# Patient Record
Sex: MALE | Race: WHITE
[De-identification: names, ages, dates, MRNs, and addresses within clinical notes are randomized per-mention and may not be internally consistent; named-entity substitution may affect disease eponyms.]

---

## 2021-08-23 ENCOUNTER — HOSPITAL ENCOUNTER (INPATIENT)
Dept: HOSPITAL 35 - ER | Age: 71
LOS: 4 days | Discharge: NURSING, REHAB OR LONG-TERM CARE | DRG: 682 | End: 2021-08-27
Attending: FAMILY MEDICINE | Admitting: FAMILY MEDICINE
Payer: COMMERCIAL

## 2021-08-23 VITALS — DIASTOLIC BLOOD PRESSURE: 102 MMHG | SYSTOLIC BLOOD PRESSURE: 170 MMHG

## 2021-08-23 VITALS — SYSTOLIC BLOOD PRESSURE: 163 MMHG | DIASTOLIC BLOOD PRESSURE: 112 MMHG

## 2021-08-23 VITALS — DIASTOLIC BLOOD PRESSURE: 73 MMHG | SYSTOLIC BLOOD PRESSURE: 124 MMHG

## 2021-08-23 VITALS — DIASTOLIC BLOOD PRESSURE: 87 MMHG | SYSTOLIC BLOOD PRESSURE: 171 MMHG

## 2021-08-23 VITALS — HEIGHT: 75 IN | BODY MASS INDEX: 31.21 KG/M2 | WEIGHT: 251 LBS

## 2021-08-23 DIAGNOSIS — Z20.822: ICD-10-CM

## 2021-08-23 DIAGNOSIS — N40.0: ICD-10-CM

## 2021-08-23 DIAGNOSIS — I42.9: ICD-10-CM

## 2021-08-23 DIAGNOSIS — G62.9: ICD-10-CM

## 2021-08-23 DIAGNOSIS — Z86.010: ICD-10-CM
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
## 2021-08-23 DIAGNOSIS — R41.0: ICD-10-CM
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
## 2021-08-23 DIAGNOSIS — E86.0: ICD-10-CM

## 2021-08-23 DIAGNOSIS — Z87.891: ICD-10-CM

## 2021-08-23 DIAGNOSIS — Z98.42: ICD-10-CM

## 2021-08-23 DIAGNOSIS — E87.6: ICD-10-CM

## 2021-08-23 DIAGNOSIS — S09.90XA: ICD-10-CM

## 2021-08-23 DIAGNOSIS — G20: ICD-10-CM

## 2021-08-23 DIAGNOSIS — K21.9: ICD-10-CM

## 2021-08-23 DIAGNOSIS — G25.81: ICD-10-CM

## 2021-08-23 DIAGNOSIS — N17.9: Primary | ICD-10-CM

## 2021-08-23 DIAGNOSIS — I48.91: ICD-10-CM

## 2021-08-23 DIAGNOSIS — G90.8: ICD-10-CM
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
## 2021-08-23 DIAGNOSIS — Z98.41: ICD-10-CM

## 2021-08-23 DIAGNOSIS — Z95.0: ICD-10-CM

## 2021-08-23 DIAGNOSIS — M19.90: ICD-10-CM

## 2021-08-23 DIAGNOSIS — G93.41: ICD-10-CM
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
## 2021-08-23 LAB
ALBUMIN SERPL-MCNC: 3.8 G/DL (ref 3.4–5)
ALT SERPL-CCNC: 10 U/L (ref 30–65)
ANION GAP SERPL CALC-SCNC: 8 MMOL/L (ref 7–16)
AST SERPL-CCNC: 16 U/L (ref 15–37)
BASOPHILS NFR BLD AUTO: 0.7 % (ref 0–2)
BILIRUB SERPL-MCNC: 1.3 MG/DL (ref 0.2–1)
BILIRUB UR-MCNC: NEGATIVE MG/DL
BUN SERPL-MCNC: 24 MG/DL (ref 7–18)
CALCIUM SERPL-MCNC: 8.4 MG/DL (ref 8.5–10.1)
CHLORIDE SERPL-SCNC: 105 MMOL/L (ref 98–107)
CO2 SERPL-SCNC: 31 MMOL/L (ref 21–32)
COLOR UR: YELLOW
CREAT SERPL-MCNC: 1.7 MG/DL (ref 0.7–1.3)
EOSINOPHIL NFR BLD: 1.3 % (ref 0–3)
ERYTHROCYTE [DISTWIDTH] IN BLOOD BY AUTOMATED COUNT: 13.2 % (ref 10.5–14.5)
GLUCOSE SERPL-MCNC: 104 MG/DL (ref 74–106)
GRANULOCYTES NFR BLD MANUAL: 76.1 % (ref 36–66)
HCT VFR BLD CALC: 41.8 % (ref 42–52)
HGB BLD-MCNC: 14.1 GM/DL (ref 14–18)
KETONES UR STRIP-MCNC: (no result) MG/DL
LYMPHOCYTES NFR BLD AUTO: 13 % (ref 24–44)
MCH RBC QN AUTO: 31.8 PG (ref 26–34)
MCHC RBC AUTO-ENTMCNC: 33.8 G/DL (ref 28–37)
MCV RBC: 94 FL (ref 80–100)
MONOCYTES NFR BLD: 8.9 % (ref 1–8)
NEUTROPHILS # BLD: 3.9 THOU/UL (ref 1.4–8.2)
PLATELET # BLD: 123 THOU/UL (ref 150–400)
POTASSIUM SERPL-SCNC: 3.1 MMOL/L (ref 3.5–5.1)
PROT SERPL-MCNC: 6.4 G/DL (ref 6.4–8.2)
RBC # BLD AUTO: 4.45 MIL/UL (ref 4.5–6)
RBC # UR STRIP: NEGATIVE /UL
SODIUM SERPL-SCNC: 144 MMOL/L (ref 136–145)
SP GR UR STRIP: 1.02 (ref 1–1.03)
URINE CLARITY: CLEAR
URINE GLUCOSE-RANDOM*: NEGATIVE
URINE LEUKOCYTES-REFLEX: NEGATIVE
URINE NITRITE-REFLEX: NEGATIVE
URINE PROTEIN (DIPSTICK): (no result)
UROBILINOGEN UR STRIP-ACNC: 0.2 E.U./DL (ref 0.2–1)
WBC # BLD AUTO: 5.2 THOU/UL (ref 4–11)

## 2021-08-23 PROCEDURE — 10081 I&D PILONIDAL CYST COMP: CPT

## 2021-08-23 NOTE — EKG
[address] [place]  [address]
Phone:  [phone]                    ELECTROCARDIOGRAM REPORT      
_______________________________________________________________________________
 
Name:       ANURAG LOPEZ                  Room #:                     REG DEV VALENTIN#:      [id number]     Account #:      [id number]  
Admission:  21    Attend Phys:                          
Discharge:              Date of Birth:  [birth year]  
                                                          Report #: [id number]
   [id number]
_______________________________________________________________________________
                         [facility] ED
                                       
Test Date:    2021               Test Time:    15:05:02
Pat Name:     ANURAG LOPEZ             Department:   
Patient ID:   [id number]            Room:          
Gender:       M                        Technician:   FEROZ
:          1950               Requested By: Dustin Beltran
Order Number: [id number] MD:   Demetris Kraus
                                 Measurements
Intervals                              Axis          
Rate:         70                       P:            
NH:           197                      QRS:          -78
QRSD:         190                      T:            86
QT:           488                                    
QTc:          527                                    
                           Interpretive Statements
A-V dual-paced rhythm with some inhibition
No further analysis attempted due to paced rhythm
Baseline wander in lead(s) II,III,aVR,aVF,V2,V5
Compared to ECG 2020 08:51:16
Atrial-paced complex(es) or rhythm no longer present
First degree AV block no longer present
Electronically Signed On 2021 15:15:58 CDT by Demetris Kraus
[email or web address]
 
 
 
 
 
 
 
 
 
 
 
 
 
 
 
 
 
 
 
  <ELECTRONICALLY SIGNED>
   By: Demetris Kraus MD, [facility]    
  21     1515
D: 21 1505                           _____________________________________
T: 21 1505                           Demetris Kraus MD, [facility]      /EPI

## 2021-08-23 NOTE — EMS
[place] [address]                     EMS Patient Care Report       
_______________________________________________________________________________
 
Name:       ANURAG LOPEZ                  Room #:         215-P       ADM IN  
M.R.#:      [id number]                       Account #:      [id number]  
Admission:  21    Attend Phys:    Vaibhav Carpenter MD  
Discharge:              Date of Birth:  [birth year]  
                                                          Report #: [id number]
                                                                    [id number]
_______________________________________________________________________________
THIS REPORT FOR:   //name//                          
 
Report Transmitted: 2021 10:15
EMS Care Summary
[place], [place]/[facility]
Incident [id number] @ 2021 13:35
 
Incident Location
[address] [place] [address]
 
Patient
ANURAG LOPEZ
Male, 71 Years
 1950
 
Patient Address
[address] [place] [address]
 
Patient History
Parkinson's Disease,
 
Patient Allergies
No known allergies,
 
Patient Medications
Levothyroxine,
 
Chief Complaint
SHOULDER PAIN
 
Disposition
Transported No Lights/Bena
 
Dispatch Reason
Falls
 
Transported To
[facility]
 
Narrative
SCENE: ON ARRIVAL PT FOUND LYING SUPINE IN THE GARAGE OF ADDRESS PROVIDED. P37 
ON SCENE HAS PLACED PT ON [place]GAMOVER PRIOR TO EMS ARRIVAL. PT IS AWAKE AND ALERT 
WITHA GCS OF 15. PT REPORTS HE LOST HIS FOOTING AND FELL. PT INITIALLY DENIES 
LOC, BUT REPORTS TAKING A BLOOD THINNER FOR A FIB. PT C/O LEFT SHOULDER PAIN. 
 
 
 
[place] [address]                     EMS Patient Care Report       
_______________________________________________________________________________
 
Name:       ANURAG LOPEZ                  Room #:         215-P       [facility] [place]  
[facility].#:      [id number]                       Account #:      [id number]  
Admission:  21    Attend Phys:    Vaibhav Carpenter MD  
Discharge:              Date of Birth:  [birth year]  
                                                          Report #: [id number]
                                                                    [id number]
_______________________________________________________________________________
PT LIFTED ONTO EMS STRETCHER. 
 
AMBULANCE: VITALS MONITORED. NNO CHANGES.
 
Initial Vitals
@13:48P: 70,R: 14,BP: 151/103,GCS: 15,SpO2: 93,Revised Trauma: 12,
@13:51P: 80,R: 16,BP: 159/79,Pain: 4/10,GCS: 15,SpO2: 96,Revised Trauma: 12,
 
Assessments
@13:57MENTAL:SKIN:No Abnormalities,HEENT:Head/Face: No Abnormalities,Eyes: No 
Abnormalities,Neck/Airway: No Abnormalities,LUNG SOUNDS:General: No 
Abnormalities,Left Upper: No Abnormalities,Right Upper: No Abnormalities,Left 
Lower: No Abnormalities,Right Lower: No Abnormalities,ABDOMEN:General: No 
Abnormalities,Left Upper: No Abnormalities,Right Upper: No Abnormalities,Left 
Lower: No Abnormalities,Right Lower: No Abnormalities,PELVIS//GI:No 
Abnormalities,EXTREMITIES:Left Arm: Other,Right Arm: No Abnormalities,Left Leg: 
No Abnormalities,Right Leg: No Abnormalities,PULSE:NEURO:No 
Abnormalities,@13:57MENTAL:No Abnormalities,SKIN:No 
Abnormalities,HEENT:Head/Face: No Abnormalities,Eyes: No 
Abnormalities,Neck/Airway: No Abnormalities,LUNG SOUNDS:General: No 
Abnormalities,Left Upper: No Abnormalities,Right Upper: No Abnormalities,Left 
Lower: No Abnormalities,Right Lower: No Abnormalities,ABDOMEN:General: No 
Abnormalities,Left Upper: No Abnormalities,Right Upper: No Abnormalities,Left 
Lower: No Abnormalities,Right Lower: No Abnormalities,PELVIS//GI:No 
Abnormalities,EXTREMITIES:Left Arm: Other,Right Arm: No Abnormalities,Left Leg: 
No Abnormalities,Right Leg: No Abnormalities,PULSE:NEURO:No Abnormalities, 
 
Impression
Extremity Pain
 
Procedures
@13:57ALS AssessmentResponse: UnchangedSucceeded@13:57StretcherResponse: 
Unchanged 
 
Timeline
13:33,Call Received
13:33,Dispatch Notified
13:35,Dispatched
13:36,En Route
13:43,On Scene
13:44,At Patient
13:48,BP: 151/103 M,PULSE: 70,RR: 14 R,SPO2: 93 Ox,ETCO2:  ,BG: ,PAIN: ,GCS: 15,
13:49,Depart Scene
13:51,BP: 159/79 M,PULSE: 80,RR: 16 R,SPO2: 96 Ox,ETCO2:  ,BG: ,PAIN: 4,GCS: 15,
13:57,ALS Assessment,Response: UnchangedSucceeded,
13:57,Stretcher,Response: Unchanged
 
 
 
[facility]
[address]
[place]   [address]                     EMS Patient Care Report       
_______________________________________________________________________________
 
Name:       ANURAG LOPEZ                  Room #:         215-P       ADM IN  
M.R.#:      [id number]                       Account #:      [id number]  
Admission:  21    Attend Phys:    Vaibhav Carpenter MD  
Discharge:              Date of Birth:  [birth year]  
                                                          Report #: [id number]
                                                                    [id number]
_______________________________________________________________________________
13:59,At Destination
14:33,Call Closed
 
Disclaimer
v1.1     Copyright  [employer]
This EMS Care Summary contains data elements from the applicable legal record 
(which may be displayed differently). It is designed to provide pertinent 
information for the following purposes: continuity of care, clinical quality, 
and state data reporting. The complete legal record is available to ED staff 
and administrators of the receiving hospital in [employer] Patient Tracker. All data 
is provided "as is."

## 2021-08-24 VITALS — DIASTOLIC BLOOD PRESSURE: 94 MMHG | SYSTOLIC BLOOD PRESSURE: 128 MMHG

## 2021-08-24 VITALS — DIASTOLIC BLOOD PRESSURE: 108 MMHG | SYSTOLIC BLOOD PRESSURE: 166 MMHG

## 2021-08-24 VITALS — DIASTOLIC BLOOD PRESSURE: 107 MMHG | SYSTOLIC BLOOD PRESSURE: 172 MMHG

## 2021-08-24 VITALS — SYSTOLIC BLOOD PRESSURE: 154 MMHG | DIASTOLIC BLOOD PRESSURE: 101 MMHG

## 2021-08-24 VITALS — SYSTOLIC BLOOD PRESSURE: 129 MMHG | DIASTOLIC BLOOD PRESSURE: 91 MMHG

## 2021-08-24 LAB
ANION GAP SERPL CALC-SCNC: 9 MMOL/L (ref 7–16)
BUN SERPL-MCNC: 19 MG/DL (ref 7–18)
CALCIUM SERPL-MCNC: 8.5 MG/DL (ref 8.5–10.1)
CHLORIDE SERPL-SCNC: 104 MMOL/L (ref 98–107)
CO2 SERPL-SCNC: 31 MMOL/L (ref 21–32)
CREAT SERPL-MCNC: 1.2 MG/DL (ref 0.7–1.3)
GLUCOSE SERPL-MCNC: 98 MG/DL (ref 74–106)
POTASSIUM SERPL-SCNC: 2.9 MMOL/L (ref 3.5–5.1)
SODIUM SERPL-SCNC: 144 MMOL/L (ref 136–145)

## 2021-08-24 NOTE — NUR
Patient admits post fall at home. Patient with hx of Parkinsons. Patient
evaled by [facility] and initially accepted. Patient declined wanted to return home
soon. Reports wife is in [place].  When met with patient he was trying to
stand and reported his legs were hurting. Notfied RN. Patient became
increasing restless. He wanted to leave hospital. Security arrived and patient
in restraints. Sp with wife. She reports this has happended last time patient
in hospital. She reports patient takes Ropinirole for Parkinsons and restless
legs. She reports he takes 3 times a day. If he has not rec his legs become
very restless and he will want to walk. Wife reports they live in single story
home with ramp. Patient uses 4 prong cane or rolator walker intermittantly.
Groceries delivered. Both cook. Wife sets up pill box for patient. Patient
drives. Wife reports she is in w[place] but can manage at home. Her sister in law
lives across the street and can assist. Patient has rec outpatient therapy at
[facility] in past. No hx of HH. Reviewed role of casemgt. Therapy evals in process
casemgt following.

## 2021-08-24 NOTE — NUR
PT ALERT AND ORIENTED TIMES THREE THIS MORNING. VSS. PT C/O PAIN PRN PAIN
MEDICAIONS GIVEN WITH GOOD RE[employer]. PT TOLERATES MEDS AND MEALS. PT UP NWITH
ASSIST OF ONE WILL CONTINUE TO MONITOR.

## 2021-08-24 NOTE — NUR
Pt was an ER admit who present with fall. Pt is in fall precaution.
Verbalizes pain. Pain medication administered. Admission assessment and
documentation documented. Pt is alert and oriented and follows direction.
Scheduled meds administered to pt. Vital signs stable. No acute events through
the night. Continue to monitor.No further needs at this time.

## 2021-08-25 VITALS — DIASTOLIC BLOOD PRESSURE: 97 MMHG | SYSTOLIC BLOOD PRESSURE: 147 MMHG

## 2021-08-25 VITALS — SYSTOLIC BLOOD PRESSURE: 184 MMHG | DIASTOLIC BLOOD PRESSURE: 115 MMHG

## 2021-08-25 VITALS — SYSTOLIC BLOOD PRESSURE: 184 MMHG | DIASTOLIC BLOOD PRESSURE: 114 MMHG

## 2021-08-25 VITALS — SYSTOLIC BLOOD PRESSURE: 179 MMHG | DIASTOLIC BLOOD PRESSURE: 105 MMHG

## 2021-08-25 LAB
ANION GAP SERPL CALC-SCNC: 10 MMOL/L (ref 7–16)
BUN SERPL-MCNC: 17 MG/DL (ref 7–18)
CALCIUM SERPL-MCNC: 8.4 MG/DL (ref 8.5–10.1)
CHLORIDE SERPL-SCNC: 104 MMOL/L (ref 98–107)
CO2 SERPL-SCNC: 28 MMOL/L (ref 21–32)
CREAT SERPL-MCNC: 0.9 MG/DL (ref 0.7–1.3)
GLUCOSE SERPL-MCNC: 89 MG/DL (ref 74–106)
POTASSIUM SERPL-SCNC: 3.3 MMOL/L (ref 3.5–5.1)
SODIUM SERPL-SCNC: 142 MMOL/L (ref 136–145)

## 2021-08-25 NOTE — NUR
PT RESTRAINED WITH B/L SOFT WRIST RESTRAINTS MD [facility] PROTOCAL. PT CONTINUES TO
BE AGITATED, VERBALLY AND PHYSICALLY ABUSIVE TOWARDS STAFF. NEUROLOGY AND
PSYCH CONSULTED. POTASSIUM REPLACED PER [facility] PROTOCAL. PT SOME HOW PULLED PIV
OUT. RN STARTED NEW PIV. PT AFEBRILE, ADEQUATE UOP (INCONTINENT), NO BM, POOR
APPETITE. PT, WIFE, AND NEPHEW HAVE ALL BEEN THOUROUGHLY UPDATED AND EDUCATED
ON PT CONDITION AND POC. PT NOT PROGRESSING TOWARDS POC.

## 2021-08-25 NOTE — HC
[facility]
[address]
[place]   [address]                     CONSULTATION                  
_______________________________________________________________________________
 
Name:       ANURAG LOPEZ                  Room #:         215-P       ADM IN  
M.R.#:      [id number]                       Account #:      [id number]  
Admission:  08/23/21    Attend Phys:    Vaibhav Carpenter MD  
Discharge:              Date of Birth:  [birth year]  
                                                          Report #: [id number]
                                                                    [id number] 
_______________________________________________________________________________
THIS REPORT FOR:  
 
cc:  Vaibhav Carpenter MD, Neal A. MD Khosla,Michael REYES MD                                         ~
 
DATE OF SERVICE: 08/24/2021
 
HISTORY OF PRESENT ILLNESS:  A 71-year-old male patient who is a poor historian.
 Later on the formal testing indicated that his memory is not very good.  The 
patient was admitted to the hospital because he fell.  Before he fell, he 
appeared to be dizzy.  His BUN and creatinine were high.  His GFR was low, which
has improved and he is feeling better.
 
He has a history of Parkinson disease in the past.  He says he sees a 
neurologist in [facility], but he does not know the name.  He has a history of 
atrial fibrillation as well as orthostatic hypotension.  Some of the records 
indicate that he was on some blood thinner, but I do not find any blood thinner 
on him.  He is on fludrocortisone  and I suspect that is because of his postural
hypotension.  He also has restless leg syndrome.  He is on multiple medications.
 He is on pretty high dose of ropinirole.  He is having multiple metabolic 
problems like his potassium is low.  His BUN and creatinine was high when he 
came in, indicating that he was probably dehydrated.  He did hit his head.  He 
did fall backward and his CT head and C-spine, it was done in the emergency room
and apparently it was okay.  Now his main complaint is that he is having 
restless leg syndrome.  He says he cannot sit or stay still.  He is also on 
gabapentin, he does not know why it is, but may have been tried for restless leg
syndrome.
 
REVIEW OF SYSTEMS:  Also positive for abrasion, acute kidney injury.  He has a 
pacemaker.  He has a history of chest pain, hypertension, bundle taniya block, 
palpitation, Parkinson disease, pulmonary edema.  Records Indicate he had 
multiple falls, but he says he had only one.  This was his relevant 14-point 
review of system.  He also has benign prostatic hypertrophy, Agent Orange 
exposure, some problem with upper eyelid, cardioversion.  He had a cardiac 
catheterization in the past, some question of dysphagia, question of neuropathy,
he does not know the reason and this patient does have autonomic dysfunction.
 
PAST MEDICAL HISTORY:  Positive for the diagnosis of Parkinson disease and he 
follows up with a neurologist at [facility].
 
FAMILY HISTORY:  According to him is unremarkable.
 
SOCIAL HISTORY:  He says he lives with his wife, but the wife does not have to 
help him in day to day activity much.  He used to smoke, but he does not drink 
any alcohol.
 
 
 
[facility]
[address] [place]   [address]                     CONSULTATION                  
_______________________________________________________________________________
 
Name:       ANURAG LOPEZ                  Room #:         215-P       ADM IN  
M.R.#:      [id number]                       Account #:      [id number]  
Admission:  08/23/21    Attend Phys:    Vaibhav Carpenter MD  
Discharge:              Date of Birth:  [birth year]  
                                                          Report #: [id number]
                                                                    [id number] 
_______________________________________________________________________________
 
 
PHYSICAL EXAMINATION:
VITAL SIGNS:  Indicate a blood pressure of 154/101, pulse is 70, respirations 
are 20, temperature is 97.4.
NEUROLOGIC:  He is alert and responsive.  He can tell me what month it is.  He 
could not tell me what date it is and he ultimately knew the president is, but 
could not name one before.  Cranial nerve examination II-XII looks unremarkable.
 His speech looks intact.  He moves all 4 extremities reasonably well.  His 
position sense is intact.  His pulses are present.  I could not elicit the 
reflexes in the lower extremities.  There is no meningeal sign.  There is no 
papilledema.  He is a very well-developed individual.  His hearing and vision 
looks adequate.
CARDIAC:  Indicated that he has a pacemaker.
LUNGS:  No respiratory difficulty was noticed.
 
LABORATORY DATA:  His white count is normal, but potassium is critically low.  
He did have a CT scan, which was reviewed.  He has nice pulses in both lower 
extremities.
 
IMPRESSION:
1.  History of Parkinson disease.  Presently, the patient does not have any 
Parkinson features like tremor or rigidity.  That is not unusual because when 
the patient is on treatment with Parkinson medication, those symptoms can 
disappear.
2.  History of restless leg syndrome.  The patient does appear to have restless 
leg syndrome, but he is on so much medications, but I am not sure what else we 
can do here.
3.  Present episode may be because of his autonomic insufficiency and when 
combined with what appeared to be dehydration, it was probably the etiology of 
his spell.
 
RECOMMENDATIONS:   It is difficult to do more workup on him because his GFR was 
abnormal when he came in and we cannot do an MRI because of his pacemaker.  We 
have to find out whether it was compatible or not.  I am going to get a carotid 
Doppler done in this patient.  He is already being evaluated for rehab and after
he is done with the rehabilitation, he should follow up with his own family 
doctor.  His repeat TSH was normal. I will order the vitamin B12.  Otherwise, I 
think he should follow up with his neurologist as an outpatient or even better 
he should follow up with a movement disorder specialist because his problems 
appear to be intractable and other alternate is needed to be considered in this 
patient.
 
Thank you very much for this referral.
Addendum.  This addendum is being added at the time of signing this note.  I
talked to the admitting doctor Dr. Carpenter.  I did a carotid Doppler on this
 
 
 
[facility]
[address]
[place]   [address]                     CONSULTATION                  
_______________________________________________________________________________
 
Name:       ANURAG LOPEZ                  Room #:         215-P       ADM IN  
M.R.#:      [id number]                       Account #:      [id number]  
Admission:  08/23/21    Attend Phys:    Vaibhav Carpenter MD  
Discharge:              Date of Birth:  [birth year]  
                                                          Report #: [id number]
                                                                    [id number] 
_______________________________________________________________________________
 
patient and that was unremarkable.  His TSH and vitamin B12 was also
unremarkable.  I discussed with Dr. Carpenter that I have limited things to add
to the management of this patient.  This kind of patient should go to tertiary
care center and should be followed up by movement disorder physician and memory
disorder physicians if necessary and if considered appropriate by movement
disorder physician.  I discussed with him that I do not have anything specific
to add and I will follow him as needed but he can follow-up with his own
neurologist but my suggestion will be to go to a tertiary care center and
follow-up with a movement disorder neurologist and he may refer him to a
neurologist subspecializing in memory.I spent more than 50 minutes of time
taking care of this patient today and majority was spent counseling and
coordinating the care and reviewing his imaging studies
 
 
 
 
 
 
 
 
 
 
 
 
 
 
 
 
 
 
 
 
 
 
 
 
 
 
 
 
 
 
 
  <ELECTRONICALLY SIGNED>
   By: Michael Lopez MD         
  08/25/21     1045
D: 08/24/21 1401                           _____________________________________
T: 08/25/21 0000                           Michael Lopez MD           /nt

## 2021-08-25 NOTE — NUR
PT IS CONFUSED REMAINS COMBATIVE AND IN RESTRAINTS. PT BITES AT RESTRAINTS.
REPLACED RESTRATINTS . BED EXTENDER REPLACED BY MAINTENANCE. LUNGS ARE CLEAR
ON ROOM AIR. BOWEL SOUNDS ACTIVE ABDOMEN FLAT AND SOFT. NO EDEMA NOTED. ATIVAN
GIVEN FOR AGITATION. FREQUENT ROUNDING AND OBSERVATION OF PT NEEDED. FOR
NURSING CARE AT THIS POINT. CALL LIGHT WITHIN USE .

## 2021-08-25 NOTE — NUR
EVEN WITH FREQUENT ROUNDING ON PT. PT BITING AT RESTRAINTS. SLITHERED HIS
HANDS OUT AND WAS STANDING ON THE SIDE OF THE BED BY THE COMPUTER RN SIT PT IN
THE CHAIR AND TEAM CAME TO ASSSIST PT BACK TO BED WITH GAIT BELT ON AND
REAPPLY RESTRAINTS. PAGED DR. VALENCIA FOR MEDICATION FOR PT DUE TO HIM COMING
OUT AND ATIVAN NOT HAVING MUCH OF AN EFFECT ON HELPING PT AT THIS TIME.
ONGOING NURISNG CARE AT THIS TIME. BED ALARM ON PT FALL BAND ON AND ID
BRACELET ON. HE BITES HIS WRIST BANDS OFF HIMSELF TOO. WILL CONTINUE TO
MONITOR PER NURSING AT THIS TIME.

## 2021-08-26 VITALS — DIASTOLIC BLOOD PRESSURE: 96 MMHG | SYSTOLIC BLOOD PRESSURE: 147 MMHG

## 2021-08-26 VITALS — SYSTOLIC BLOOD PRESSURE: 147 MMHG | DIASTOLIC BLOOD PRESSURE: 100 MMHG

## 2021-08-26 VITALS — SYSTOLIC BLOOD PRESSURE: 147 MMHG | DIASTOLIC BLOOD PRESSURE: 98 MMHG

## 2021-08-26 VITALS — SYSTOLIC BLOOD PRESSURE: 144 MMHG | DIASTOLIC BLOOD PRESSURE: 98 MMHG

## 2021-08-26 VITALS — DIASTOLIC BLOOD PRESSURE: 103 MMHG | SYSTOLIC BLOOD PRESSURE: 147 MMHG

## 2021-08-26 VITALS — SYSTOLIC BLOOD PRESSURE: 152 MMHG | DIASTOLIC BLOOD PRESSURE: 88 MMHG

## 2021-08-26 LAB
ANION GAP SERPL CALC-SCNC: 11 MMOL/L (ref 7–16)
BUN SERPL-MCNC: 21 MG/DL (ref 7–18)
CALCIUM SERPL-MCNC: 9.2 MG/DL (ref 8.5–10.1)
CHLORIDE SERPL-SCNC: 106 MMOL/L (ref 98–107)
CO2 SERPL-SCNC: 23 MMOL/L (ref 21–32)
CREAT SERPL-MCNC: 1.1 MG/DL (ref 0.7–1.3)
ERYTHROCYTE [DISTWIDTH] IN BLOOD BY AUTOMATED COUNT: 12.8 % (ref 10.5–14.5)
GLUCOSE SERPL-MCNC: 110 MG/DL (ref 74–106)
HCT VFR BLD CALC: 48.8 % (ref 42–52)
HGB BLD-MCNC: 16.5 GM/DL (ref 14–18)
MCH RBC QN AUTO: 32.1 PG (ref 26–34)
MCHC RBC AUTO-ENTMCNC: 33.8 G/DL (ref 28–37)
MCV RBC: 95 FL (ref 80–100)
PLATELET # BLD: 84 THOU/UL (ref 150–400)
POTASSIUM SERPL-SCNC: 3.8 MMOL/L (ref 3.5–5.1)
RBC # BLD AUTO: 5.14 MIL/UL (ref 4.5–6)
SODIUM SERPL-SCNC: 140 MMOL/L (ref 136–145)
WBC # BLD AUTO: 8.1 THOU/UL (ref 4–11)

## 2021-08-26 NOTE — NUR
PT MORE COOPERATIVE WITH STAFF AND WORKED WITH OT THIS AM. BILAT SOFT WRIST
REATRAINTS REMOVED AND SISTER AT BEDSIDE. WILL CONTINUE TO ASSESS.

## 2021-08-26 NOTE — NUR
Met with sister of dayna and sp with wife. MellyN dariel they are following to
determine if candidate for acute rehab. If patient accepted wife wants to
assure patient not to be told he is "discharged" to rehab as patient will
belive he is dc to home. Need to say he is moving to another unit. Wife and
sister all in agreement for acute rehab. They do not want him at a "facility."

## 2021-08-26 NOTE — NUR
pt up in bed a and o, family at side, no distress, able to tell me name, ,
president etc.  no concerns at this time, educated on mariano.

## 2021-08-27 ENCOUNTER — HOSPITAL ENCOUNTER (INPATIENT)
Dept: HOSPITAL 35 - REHABU | Age: 71
LOS: 25 days | Discharge: HOME HEALTH | DRG: 89 | End: 2021-09-21
Attending: PHYSICAL MEDICINE & REHABILITATION | Admitting: PHYSICAL MEDICINE & REHABILITATION
Payer: COMMERCIAL

## 2021-08-27 VITALS — DIASTOLIC BLOOD PRESSURE: 92 MMHG | SYSTOLIC BLOOD PRESSURE: 140 MMHG

## 2021-08-27 VITALS — DIASTOLIC BLOOD PRESSURE: 79 MMHG | SYSTOLIC BLOOD PRESSURE: 113 MMHG

## 2021-08-27 VITALS — SYSTOLIC BLOOD PRESSURE: 120 MMHG | DIASTOLIC BLOOD PRESSURE: 78 MMHG

## 2021-08-27 VITALS — DIASTOLIC BLOOD PRESSURE: 115 MMHG | SYSTOLIC BLOOD PRESSURE: 160 MMHG

## 2021-08-27 VITALS — WEIGHT: 250 LBS | BODY MASS INDEX: 31.08 KG/M2 | HEIGHT: 75 IN

## 2021-08-27 DIAGNOSIS — R44.3: ICD-10-CM

## 2021-08-27 DIAGNOSIS — Z98.41: ICD-10-CM

## 2021-08-27 DIAGNOSIS — N40.0: ICD-10-CM

## 2021-08-27 DIAGNOSIS — R31.9: ICD-10-CM

## 2021-08-27 DIAGNOSIS — Z86.010: ICD-10-CM

## 2021-08-27 DIAGNOSIS — I49.5: ICD-10-CM
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
## 2021-08-27 DIAGNOSIS — Z95.0: ICD-10-CM

## 2021-08-27 DIAGNOSIS — R41.0: ICD-10-CM

## 2021-08-27 DIAGNOSIS — G62.9: ICD-10-CM
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
## 2021-08-27 DIAGNOSIS — G20: ICD-10-CM
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
## 2021-08-27 DIAGNOSIS — Z87.891: ICD-10-CM

## 2021-08-27 DIAGNOSIS — Z98.42: ICD-10-CM

## 2021-08-27 DIAGNOSIS — N39.0: ICD-10-CM

## 2021-08-27 DIAGNOSIS — F01.50: ICD-10-CM

## 2021-08-27 DIAGNOSIS — R13.10: ICD-10-CM

## 2021-08-27 DIAGNOSIS — G25.81: ICD-10-CM

## 2021-08-27 DIAGNOSIS — I95.1: ICD-10-CM

## 2021-08-27 DIAGNOSIS — N17.9: ICD-10-CM

## 2021-08-27 DIAGNOSIS — E55.9: ICD-10-CM

## 2021-08-27 DIAGNOSIS — E53.8: ICD-10-CM

## 2021-08-27 DIAGNOSIS — E03.9: ICD-10-CM

## 2021-08-27 DIAGNOSIS — K59.09: ICD-10-CM

## 2021-08-27 DIAGNOSIS — R53.81: ICD-10-CM

## 2021-08-27 DIAGNOSIS — S06.0X9A: Primary | ICD-10-CM

## 2021-08-27 DIAGNOSIS — E87.6: ICD-10-CM

## 2021-08-27 DIAGNOSIS — B96.4: ICD-10-CM

## 2021-08-27 DIAGNOSIS — I48.91: ICD-10-CM

## 2021-08-27 DIAGNOSIS — D69.6: ICD-10-CM

## 2021-08-27 DIAGNOSIS — Z88.8: ICD-10-CM

## 2021-08-27 DIAGNOSIS — I42.9: ICD-10-CM
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
## 2021-08-27 DIAGNOSIS — K21.9: ICD-10-CM

## 2021-08-27 DIAGNOSIS — E86.0: ICD-10-CM

## 2021-08-27 PROCEDURE — 10112: CPT

## 2021-08-27 NOTE — NUR
1500 ADMITTED TO ROOM 506. PATIENT IS ALERT AND ORIENTED X1 TO PERSON ONLY.
SOME CONFUSION. PATIENT MOREIRA'S,  ARE EQUAL. LUNGS ARE CLEAR AND
DEMINISHED. PATIENT IS ON RA. PATIENT HAS PACEMAKER AND IS ON NO O2. ABD IS
SOFT WITH BSX4. PATIENT HAD BM YESTERDAY. PATIENT VOIDED 250 CC CONCENTRATED
URINE. ENCOURAGED PATIENT TO INCREASE HIS FLUID INTAKE. UP IN THE CHAIR AT
THIS TIME WITH NO IV ACCESS. CALL LIGHT IN REACH. PATIENT HAS CELL PHONE, BUT
IS MISSING HIS CLOTHES AND .  IS WORKING ON THIS. FALL
AND SAFETY PROTOCOLS IN PLACE. DENIES PAIN AT THIS TIME. PT/OT/ST EVALS TO BE
DONE IN A.M. WILL CONTINUE TO MONITER.

## 2021-08-27 NOTE — NUR
Pt dcing to [facility] acute rehab early this afternoon. Family at bedside and aware
and agreeable. Lily alejo liason here and will follow along should he need hh
f/u when dc'd from rehab.

## 2021-08-27 NOTE — NUR
chart review. New to acute rehab. Cm visited with heladio and his wife yara via
phone call. gocovri cap 1 at bedtime, next person to visit will bring some
more up to the nurse. Prior to hospital he was independent when feeling ok at
home. has cane, walker, shower chair and grab bars. 3 steps to enter. wife
sets up his medication. He still drives. [facility] following for
hh needs at dc. Bedside nurse asked if he has any personal clothes?, cm called
[facility] and they are checking on this. Will cont following as needed for dc
needs.

## 2021-08-27 NOTE — NUR
PT RESTLESS MOST OF THE NOC, C/O LEG PAIN PRN PAIN MED GIVEN, PT AMBULATED
AROUND GANDHI WITH WALKER, VSS, PLANNS TO GO TO REHAB TODAY, ASSESSMENT AS
CHARTED, WILL CON'T TO MONITOR PER PPOC.

## 2021-08-28 VITALS — DIASTOLIC BLOOD PRESSURE: 67 MMHG | SYSTOLIC BLOOD PRESSURE: 107 MMHG

## 2021-08-28 VITALS — SYSTOLIC BLOOD PRESSURE: 137 MMHG | DIASTOLIC BLOOD PRESSURE: 79 MMHG

## 2021-08-28 LAB
ANION GAP SERPL CALC-SCNC: 8 MMOL/L (ref 7–16)
BILIRUB UR-MCNC: NEGATIVE MG/DL
BUN SERPL-MCNC: 30 MG/DL (ref 7–18)
CALCIUM SERPL-MCNC: 8.8 MG/DL (ref 8.5–10.1)
CHLORIDE SERPL-SCNC: 102 MMOL/L (ref 98–107)
CO2 SERPL-SCNC: 27 MMOL/L (ref 21–32)
COLOR UR: (no result)
CREAT SERPL-MCNC: 1.4 MG/DL (ref 0.7–1.3)
ERYTHROCYTE [DISTWIDTH] IN BLOOD BY AUTOMATED COUNT: 12.8 % (ref 10.5–14.5)
GLUCOSE SERPL-MCNC: 90 MG/DL (ref 74–106)
HCT VFR BLD CALC: 44.9 % (ref 42–52)
HGB BLD-MCNC: 15.1 GM/DL (ref 14–18)
KETONES UR STRIP-MCNC: NEGATIVE MG/DL
MCH RBC QN AUTO: 31.7 PG (ref 26–34)
MCHC RBC AUTO-ENTMCNC: 33.5 G/DL (ref 28–37)
MCV RBC: 94.6 FL (ref 80–100)
PLATELET # BLD: 118 THOU/UL (ref 150–400)
POTASSIUM SERPL-SCNC: 4.1 MMOL/L (ref 3.5–5.1)
RBC # BLD AUTO: 4.74 MIL/UL (ref 4.5–6)
RBC # UR STRIP: (no result) /UL
RBC #/AREA URNS HPF: (no result) /HPF
SODIUM SERPL-SCNC: 137 MMOL/L (ref 136–145)
SP GR UR STRIP: 1.01 (ref 1–1.03)
URINE CLARITY: (no result)
URINE GLUCOSE-RANDOM*: NEGATIVE
URINE LEUKOCYTES-REFLEX: (no result)
URINE NITRITE-REFLEX: POSITIVE
URINE PROTEIN (DIPSTICK): (no result)
URINE WBC-REFLEX: (no result) /HPF (ref 0–5)
UROBILINOGEN UR STRIP-ACNC: 0.2 E.U./DL (ref 0.2–1)
WBC # BLD AUTO: 12.4 THOU/UL (ref 4–11)

## 2021-08-28 NOTE — NUR
PT UP IN CHAIR AT THIS TIME DUE TO BED ALARM GOING OFF AND PT WAS STANDING UP
AND HOLING ONTO THE CHAIR AND STATED HE DIDN'T KNOW WHAT TO DO. PT WAS
INSTRUCTED TO TURN AROUND AND SIT IN THE CHAIR. PT IN CHAIR NOW WITH CHAIR
ALARM.

## 2021-08-28 NOTE — NUR
patient aox1 confused and forgetful. patient ambulates with unsteady gaits in
the room and in the unit. patient impulsive at times.  patient need
redirection. patient wanted to leave the facility per wife but denied when
this nurse went to intevene. patient had high anxiety prn given per drs,
order. patient needs maximum assistance with adl, bed mobility, transfer and
toileting. patient urine is dark brown with strong odor, called  new order
of ua with c&s. fall precaution in place.  patient in bed asleep at this time
breathing regular and unlaboured.

## 2021-08-28 NOTE — NUR
PT NEEDED TO VOID AND USE URINAL. PT NEEDED ASSISTANCE WITH STANDING AND THEN
PT WAS ABLE TO STAND WITH WALKER. PT WAS NOT ABLE TO VOID ON COMMAND.

## 2021-08-28 NOTE — NUR
PT INCON. OF URINE IN BED. PLACED A CONDOM CATH ON PT AT THIS TIME. PT WANTING
TO GET UP OUT OF BED. TOLD PT WE WILL GET UP AT SUPPER TIME. PT STILL WANTING
TO GET UP, ASKED PT WHY HE WANTED TO GET UP, PT STATED HE DIDN'T WANT TO GET
IN TROUBLE. PT FORGETFUL AND NOT HAVING GOOD INSIGHT OR JUDGEMENT AT THIS
TIME.

## 2021-08-28 NOTE — NUR
PT ATTEMPTED TO VOID AGAIN PER URINAL, PT MISSED URINAL AND WAS INCON. OF
URINE. PT UP VIA WALKER TO BED. PT NEEDED TO BE TOLD TO GO TOWARDS THE BED. PT
WAS WALKING FORWARD AWAY FROM THE BED. TOLD PT TO BACK UP A FEW STEPS TOWARDS
THE HEAD OF BED. PT ABLE TO FOLLOW DIRECTIONS. PT ALARM ON WHEN IN BED.

## 2021-08-28 NOTE — NUR
DR. SCHROEDER IS HERE AND TOLD HIM THE HOME DOSE OF BETAPACE. ORDERS WAS PLACED
FOR CORRECT DOSE AND ALSO CORRECTED HOME MED LIST AS IT SHOWED BETAPACE WAS
160MG, CHANGED TO 120MG.

## 2021-08-28 NOTE — NUR
PT WIFE AND SON IS HERE WITH MORE HOME MEDS TO TAKE FOR HS. THEY WANTED TO
KNOW IF HE IS TAKING REQUIP THREE TIMES A DAY. SON STATED THAT WHEN HE MISSED
A DAYS DOSE ONE TIME HE WAS REALY CONFUSED. THEY ALSO STATED HIS BETAPACE IS
120MG NOT 160MG. PT AWAKE THIS AM IN THE BED AND ORIENTED TO SELF. PT
COMPULSIVE DURING THE NIGHT AND GETTING UP WITHOUT HELP AND CONFUSED. PT UP
WITH WALKER.

## 2021-08-29 VITALS — SYSTOLIC BLOOD PRESSURE: 129 MMHG | DIASTOLIC BLOOD PRESSURE: 67 MMHG

## 2021-08-29 VITALS — DIASTOLIC BLOOD PRESSURE: 59 MMHG | SYSTOLIC BLOOD PRESSURE: 100 MMHG

## 2021-08-29 VITALS — SYSTOLIC BLOOD PRESSURE: 100 MMHG | DIASTOLIC BLOOD PRESSURE: 59 MMHG

## 2021-08-29 VITALS — SYSTOLIC BLOOD PRESSURE: 135 MMHG | DIASTOLIC BLOOD PRESSURE: 70 MMHG

## 2021-08-29 LAB
ANION GAP SERPL CALC-SCNC: 10 MMOL/L (ref 7–16)
BUN SERPL-MCNC: 27 MG/DL (ref 7–18)
CALCIUM SERPL-MCNC: 8.8 MG/DL (ref 8.5–10.1)
CHLORIDE SERPL-SCNC: 102 MMOL/L (ref 98–107)
CO2 SERPL-SCNC: 25 MMOL/L (ref 21–32)
CREAT SERPL-MCNC: 1.4 MG/DL (ref 0.7–1.3)
GLUCOSE SERPL-MCNC: 112 MG/DL (ref 74–106)
POTASSIUM SERPL-SCNC: 4.1 MMOL/L (ref 3.5–5.1)
SODIUM SERPL-SCNC: 137 MMOL/L (ref 136–145)

## 2021-08-29 NOTE — NUR
ASSUMED CARE OF PT AT 0700. PT ALERT TO SELF. PT IS CONFUSED TALKS ABOUT THE
[employer] SOME. PT TOLERATING REGULAR DIET. PT STATES HE WANTS TO WALK. THIS NURSE
AND PCA WALKED PT TO BATHROOM WITH GAIT BELT AND WALKER. PT GAIT IS UNSTEADY
AND PT SHUFFLES FEET AND LEANS FORWARD WHILE WALKING. PT IS UP WITH ASSIST X2.
BED ALARM CHAIR ALARM IN PLACE AND ACTIVATED WILL CONTINUE TO MONITOR.

## 2021-08-29 NOTE — NUR
PT ALERT AND ORIENTED X 1, CONFUSED.  INCONT OF URINE IN LARGE AMTS.  URINE
STILL PINK-TINGED.  PT TOOK HS MEDS IN APPLESAUCE WITHOUT DIFFICULTY.  PT
DENIES PAIN OR DISCOMFORT.  BED ALARM ON FOR SAFETY.  PT APPEARS TO BE
SLEEPING ON HOURLY ROUNDS.

## 2021-08-29 NOTE — NUR
PT ALERT AND ORIENTED X 1, CONFUSED.  UP IN RECLINER ALL EVENING.  ASSISTED TO
BED AT HS WITH WALKER AND ASSIST X 2.  INCONT OF URINE.  URINE BLOOD-TINGED.
PT DENIES PAIN OR DISCOMFORT.  HAS NOT BEEN IMPULSIVE SO FAR TONIGHT.  BED
ALARM ON FOR SAFETY.  PT APPEARS TO BE SLEEPING ON HOURLY ROUNDS.

## 2021-08-29 NOTE — NUR
AT APPROXIMATELY 1322 PT WAS FOUNF ON FLOOR, IT APPEARS PT CLIMBED OUT OF
CHAIR. PT DEIES PAIN, OR HITTING HEAD. PT STATED, "I WOKE UP AND MARINA WAS ON
TOP OF ME." "I WENT TO LOOK FOR HIM BUT HE WAS GONE." WIFE CHRISTIAN NOTIFIED,
HOUSE SUPERVISER NOTIFED, DR. SCHROEDER NOTIFIED, NURSE MANAGER WAS NOTIFIED AS
WELL. PT VS AS CHARTED. NO INJURIES NOTED. WILL CONTNIUE TO MONITOR.

## 2021-08-30 VITALS — DIASTOLIC BLOOD PRESSURE: 73 MMHG | SYSTOLIC BLOOD PRESSURE: 114 MMHG

## 2021-08-30 VITALS — SYSTOLIC BLOOD PRESSURE: 114 MMHG | DIASTOLIC BLOOD PRESSURE: 73 MMHG

## 2021-08-30 VITALS — DIASTOLIC BLOOD PRESSURE: 80 MMHG | SYSTOLIC BLOOD PRESSURE: 117 MMHG

## 2021-08-30 NOTE — NUR
ASSUMED CARE OF PT AT 1900. PT IS A&O TO SELF. IS CONFUSED. HAS
HALLUCIANTIONS. IS ALSO IMPULSIVE. IS CLOSE TO NURSE STATION. FREQUENT
ROUNDING & FALL PRECAUTIONS CONTINUED THIS SHIFT. IS UP WITH 1-2 ASSIST, GAIT
BELT WALKER.  PT IS STABLE. IS ON ROOM AIR. DENIES PAIN. WAS SLEEPING FOR
ABOUT 1.5 HOURS. THEN AWAKENED CONFUSED.  HAD AN INCONTINENT EPISODE. THIS
NURSE ASSISTED TO URINATE PRIOR TO SLEEPING & AFTER AWAKENING. URINATING
APPROX 100 ML/VOID. COTY COLORED & CLOUDY URINE.  LABS & VITALS REVIEWED. PT
IS ABLE TO REPOSITION SELF IN BED. CUES PROVIDED.  CALL LIGHT WITHIN REACH.
WILL CONTINUE TO MONITOR.

## 2021-08-30 NOTE — NUR
Nutrition: pt admitted to rehab unit with falls, SANTOSH, hypokalemia. Chart
reviewed. Assessed due to new admission/poor intake? consult. Pt is eating
well, % of meals. He is alert to self, confused. Hx Parkinsons. possible
dementia. Folate deficiency, on supplement. Also on B12. Vitamin D level
pending. Noted weight down 25# from December of 2020 however pt had edema at
that time. BM 8/30. Consider low nutrition risk at present.

## 2021-08-30 NOTE — NUR
ASSUMED CARE OF PT AT 0700. PT CONFUSED. STATES HE HAD TRUCKS RUNNING THRU HIS
ROOM ALL NIGHT. STATES HE BY THE RIVER AND POINTS TO THE RIVER OUTSIDE,
REMINDED PT THAT HE WAS IN THE HOSPITAL REHAB UNIT

## 2021-08-31 VITALS — SYSTOLIC BLOOD PRESSURE: 163 MMHG | DIASTOLIC BLOOD PRESSURE: 107 MMHG

## 2021-08-31 VITALS — SYSTOLIC BLOOD PRESSURE: 129 MMHG | DIASTOLIC BLOOD PRESSURE: 79 MMHG

## 2021-08-31 NOTE — NUR
REPORTS FEELING "WORSE" TODAY DURING AM ASSESSMENT-UNABLE TO ID ANY SPECIFIC
COMPLAINT OR ISSUE OTHER THAN FEELIING "WEEK" NOTED TO HAVE SEVERAL LARGE
LOOSE DIARRHEA STOOLS THIS AM-AND DOES REPORT SOME CRAMPING/STOMACH DISCOMFORT
WITH BM. USING ROLLER WALKER AND MOD ASSIST X 2 FOR AMBULATION. ORIENTED TO
PERSON ONLY.

## 2021-08-31 NOTE — NUR
HAS FREQUENTLY BEEN REQUESTING TO USE URINAL OR GO TO BATHROOM-INSISTING THAT
SHORTS AND BRIEF BE REMOVED BECAUSE "THEY GOT REALLY WET" ALTHOUGH WHEN STAFF
CHECKS PANTS AND BRIEF ARE DRY. WHEN ASKED IF HE WOULD LIKE TO GET UP IN CHAIR
FOR PM STATES "I DON'T THINK THEY WILL LET ME-THEY CAN'T WORK WITH ME FALLING
ALL OVER THE FLOOR" COMPLIENT W MEDS-CONTINUES TO DENY PAIN/DISCOMFORT,
CONVERSATION AT TIMES CIRCUMSTANTIAL AND NON-GOAL DIRECTED. CONTINUES ON FALLS
PRECAUTIONS

## 2021-08-31 NOTE — NUR
Team meeting, recommendation uses [facility], has his own here. wife will cont. to
do meds and finances. he is truing off chair alarm. No driving till cleared by
MD.  Anticipate 9/10. hh (pt, ot, st, nursing and sw)

## 2021-08-31 NOTE — NUR
ASSUMED CARE OF PT AT 1915. PT IS A&OX1. IS IMPULSIVE, COMBATIVE, CONFUSED, &
FORGETFUL. PT CONTINUALLY THREATENED TO LEAVE & GO HOME. HAS CALLED WIFE &
SISTER SEVERAL TIMES ASKING FOR HIS "BILL FOLD" & FOR THEM TO "COME GET ME".
WHEN FAMILY RESPONDS NO, PT HANGS UP THE PHONE ON THEM. PT HAS POURED OUT HIS
URINAL ONTO THE FLOOR, SPAT ON THE FLOOR, & TOOK CLOTHES OFF. THIS NURSE, DAY
NURSE, ANOTHER NURSE, & PCA HAVE ALL USED THERAPUETIC COMMUNICATION AS WELL AS
ORIENTED PT TO SITUATION & PLACE. THIS NURSE & PCA AMBULATED WITH PT IN
HALLWAYS WITH GAIT, BELT, WALKER & FOLLOWED WITH W/C.  FREQUENT CUES WERE
GIVEN FOR PT TO STAND UP STRAIGHT, AS HE CONSISTENTLY LEANED FORWARD &
SHUFFLED HIS FEET. PT DID NOT SLEEP MUCH LAST NIGHT. WAS AWAKE Q30-45 MIN. PT
WAS ADMINISTERED IM HALDO & OTHER NIGHT MEDS. IS STABLE. IS ON ROOM AIR. IS
NEAR NURSE STATION. FREQUENT CHECKS & FALL PRECATUIONS CONTINUED THIS SHIFT.
DENIES PAIN BESIDES RESTLESS LEGS. LABS & VITALS REVIEWED. IS CURRENTLY
ASLEEP. CALL LIGHT WITHIN REACH. WILL CONTINUE TO MONITOR.

## 2021-09-01 VITALS — SYSTOLIC BLOOD PRESSURE: 142 MMHG | DIASTOLIC BLOOD PRESSURE: 86 MMHG

## 2021-09-01 VITALS — SYSTOLIC BLOOD PRESSURE: 150 MMHG | DIASTOLIC BLOOD PRESSURE: 102 MMHG

## 2021-09-01 VITALS — DIASTOLIC BLOOD PRESSURE: 107 MMHG | SYSTOLIC BLOOD PRESSURE: 158 MMHG

## 2021-09-01 NOTE — NUR
ADM VIT D 50,000 UNITS X1. PT TOOK ORAL WITHOUT ANY ISSUES. PT DID SAY HE
NEEDED TO USE THE BATHROOM AND USED THE URINAL AND VOIDED 100ML OF YELLOW
URINE.

## 2021-09-01 NOTE — NUR
PT SITTING OUT IN DINING ROOM GETTING EVAL FROM ST. PT TOLERATING DIET WELL.
PT ON THIN LIQUIDS, TOOK MEDS WHOLE ALL AT A TIME. SPEECH THERAPY SUGGESTED
TAKING ONE MED AT A TIME. PT UP WITH WALKER AND ASSIST. PT WAS IN W/C OUT IN
DINING ROOM.

## 2021-09-02 VITALS — SYSTOLIC BLOOD PRESSURE: 148 MMHG | DIASTOLIC BLOOD PRESSURE: 96 MMHG

## 2021-09-02 VITALS — DIASTOLIC BLOOD PRESSURE: 107 MMHG | SYSTOLIC BLOOD PRESSURE: 159 MMHG

## 2021-09-02 NOTE — NUR
ASSUMED CARE AT 1900 OF 9/1. PATIENT IS A&O TO SELF ONLY. PATIENT WAS SLEEPING
AT START OF SHIFT, PER REPORT PATIENT HAS BEEN NAPPING SINCE AFTER DINNER. AT
HS AROUND 2130 PATIENT WOKE UP AND BEGAN ASKING IF HE WILL BE STAYING HERE FOR
A WEEK, BECAUSE HE NEEDS TO TAKE CARE OF SOMETHINGS. PATIENT IS REPORIENTED TO
PLACE, TIME AND SITUATION. CONTINUES TO SAY HE WONT BE ABLE TO STAY HERE LONG.
HS MEDICATIONS AND PRN PO HALDOL ADMINISTERED. PATIENT WAS ABLE TO CALM DOWN
AND SLEEP FOR ABOUT AN HOUR. PATIENT USED CALL LIGHT TO REQUEST TO USE URINAL
IN BED, BUT PATIENT HAS ALREADY HAS AN EPISODE OF BLADDER INCONTINENCE. FULL
LINEN CHANGE PERFORMED, REQUIRING MINIMAL ASSIT WITH TURNS. PATIENT IS
REMINDED TO USE CALL LIGHT WHEN THE URGE TO PEE COMES AGAIN. PATIENT HAS BEEN
COMPLIANT WITH CALL LIGHT USAGE. NO ATTEMPTS OF GETTING OUT OF BED WITHOUT
ASSISTANCE DURING SHIFT SO FAR. PATIENT WAS ABLE TO DOSE IN AND OUT OF SLEEP,
AS NOTED DURING HOURLY ROUNDS. AROUND 0100 PATIENT AWOKE, AND REQUESTED FOR A
SNACK, WHICH WAS PROVIDED TO PATIENT. PATIENT APPEARS TO BECOME AGITATED AGAIN
AND SAYS HE NEEDS TO LEAVE FOR A LITTLE BIT AND WILL BE BACK BY NOON.
NURSE CONTINUES TO REORIENT PATIENT TO TIME, PLACE AND SITUATION, AND
REDIRECTS PATIENT TO SLEEP SO HE CAN WAKE UP TO PARTICIPATE WITH THERAPY AND
GET STRONGER. PATIENT AGREES. CALL LIGHT PLACED WITHIN REACH, BED ALARM ON AND
HOURLY ROUNDS CONTINUED. WILL CONTINUE TO MONITOR.

## 2021-09-02 NOTE — NUR
CM gave dpoa booklet to NP to give to pt and his wife who is here for visit.
CM notified house sup that might need notary.

## 2021-09-02 NOTE — NUR
PATIENT CARE ASSUMED AT 0700, PATIENT LAYING IN BED BUT WAS NOT SLEEPING,
HE IS ALERT TO SELF AND DISORIENTED TO PLACE, TIME AND SITUATION, ASSESSMENT
DONE, BREATH SOUND CLEAR, ACTIVE BOWEL SOUND, HR NORMAL, RR EVEN NONLABORED ON
RA, COLOR BRISK WITH CAPILLARY REFILL, PATIENT DENIES PAIN, HE HAD A SHOWER
WITH THE HELP OF OT, SPEECH THERAPY ALSO MONITOR HIM WHILE HE TOOK HIS
MEDICATION WHOLE WITH WATER BUT ONE PILL AT A TIME, HE AMBULATE WITH
WALKER/WHEEL CHAIR, ONE PERSON ASSIST, HE IS INCONT OF BLADDER AT TIMES, USES
URINAL BY HIS BEDSIDE, FALL PRECAUTION IN PLACE, WILL CONTINUE TO MONITOR
PATIENT FOR SAFETY.

## 2021-09-02 NOTE — NUR
PT ALERT AND ORIENTED X 1, CONFUSED.  VOIDING SMALL AMTS YELLOW URINE PER
URINAL.  PT TOOK HS MEDS WITH WATER WITHOUT DIFFICULTY.  PT DENIES PAIN OR
DISCOMFORT.  PT HAS NOT BEEN IMPULSIVE SO FAR TONIGHT.  BED ALARM ON FOR
SAFETY.  PT CHECKED ON MORE FREQUENTLY THAN HOURLY ROUNDS.  APPEARS TO BE
SLEEPING.

## 2021-09-03 VITALS — SYSTOLIC BLOOD PRESSURE: 128 MMHG | DIASTOLIC BLOOD PRESSURE: 76 MMHG

## 2021-09-03 VITALS — SYSTOLIC BLOOD PRESSURE: 100 MMHG | DIASTOLIC BLOOD PRESSURE: 68 MMHG

## 2021-09-03 VITALS — DIASTOLIC BLOOD PRESSURE: 110 MMHG | SYSTOLIC BLOOD PRESSURE: 153 MMHG

## 2021-09-03 LAB
ANION GAP SERPL CALC-SCNC: 9 MMOL/L (ref 7–16)
BASOPHILS NFR BLD AUTO: 0.9 % (ref 0–2)
BUN SERPL-MCNC: 31 MG/DL (ref 7–18)
CALCIUM SERPL-MCNC: 8.9 MG/DL (ref 8.5–10.1)
CHLORIDE SERPL-SCNC: 103 MMOL/L (ref 98–107)
CO2 SERPL-SCNC: 28 MMOL/L (ref 21–32)
CREAT SERPL-MCNC: 1.7 MG/DL (ref 0.7–1.3)
EOSINOPHIL NFR BLD: 1.2 % (ref 0–3)
ERYTHROCYTE [DISTWIDTH] IN BLOOD BY AUTOMATED COUNT: 12.6 % (ref 10.5–14.5)
GLUCOSE SERPL-MCNC: 93 MG/DL (ref 74–106)
GRANULOCYTES NFR BLD MANUAL: 75.5 % (ref 36–66)
HCT VFR BLD CALC: 45.1 % (ref 42–52)
HGB BLD-MCNC: 15.5 GM/DL (ref 14–18)
LYMPHOCYTES NFR BLD AUTO: 14.2 % (ref 24–44)
MCH RBC QN AUTO: 32.6 PG (ref 26–34)
MCHC RBC AUTO-ENTMCNC: 34.4 G/DL (ref 28–37)
MCV RBC: 94.8 FL (ref 80–100)
MONOCYTES NFR BLD: 8.2 % (ref 1–8)
NEUTROPHILS # BLD: 4.5 THOU/UL (ref 1.4–8.2)
PLATELET # BLD: 210 THOU/UL (ref 150–400)
POTASSIUM SERPL-SCNC: 4.5 MMOL/L (ref 3.5–5.1)
RBC # BLD AUTO: 4.76 MIL/UL (ref 4.5–6)
SODIUM SERPL-SCNC: 140 MMOL/L (ref 136–145)
WBC # BLD AUTO: 6 THOU/UL (ref 4–11)

## 2021-09-03 NOTE — NUR
Nutrition: pt seen for early followup. Continues to eat very well, % of
meals. Psych following. Alert to self, confused. Hx Parkinsons, possible
dementia. On folate and vitamin D for deficiency. 9/1 BM. Continue low risk.

## 2021-09-03 NOTE — NUR
ASSUMED CARE OF PT AT 1430. PT IS A&O TO SELF. IS ON ROOM AIR. DENIES PAIN. IS
STABLE. IS UP WITH 1 ASSIST, GB, WALKER. FALL PRECAUTIONS & HOURLY ROUNDING
CONTINUED. LABS & VITALS REVIEWED. PT CAN BE IMPULSIVE AT TIMES & SUNDOWNS. PT
IS CURRENTLY IN BED WATCHING TV. CALL LIGHT WITHIN REAH. WILL CONTINUE TO
MONITOR.

## 2021-09-03 NOTE — HC
[facility]
[address] [place]   [address]                     CONSULTATION                  
_______________________________________________________________________________
 
Name:       ANURAG LOPEZ                  Room #:         503-P       ADM IN  
M.R.#:      [id number]                       Account #:      [id number]  
Admission:  08/27/21    Attend Phys:    Ottoniel Gipson MD 
Discharge:              Date of Birth:  [birth year]  
                                                          Report #: [id number]
                                                                    [id number] 
_______________________________________________________________________________
THIS REPORT FOR:  
 
cc:  Vaibhav Carpenter MD, Neal A. MD Deutch,Vaibhav HERMAN. PhD                                           ~
 
DATE OF SERVICE: 08/29/2021
 
NEUROBEHAVIORAL STATUS EXAMINATION
 
AGE:  71
 
ATTENDING PHYSICIAN:  Ottoniel Gipson MD
 
CONSULTANT:  Vaibhav Lopez, PhD
 
CLINICAL PRESENTATION:  The patient is a 71-year-old male admitted to the [facility] for a comprehensive inpatient rehabilitation program to
improve functional mobility, activities of daily living and self-care and
mental status secondary to deficits from a traumatic brain injury and multiple
medical problems.  He is reported to have been sustained a fall, striking his
head when entering his home.  
 
He has a history of Parkinson's disease that includes visual hallucinations. 
His problem list on initial admission to the hospital is acute kidney injury,
bradycardia, chest pain, dyspnea, elevated troponin, fall, former smoker with
remote discontinuation in his past, history of atrial fibrillation,
hypertension, hypokalemia and hypotension, left bundle branch block, left knee
pain, low back pain, obesity, palpitations, Parkinson's disease, pulmonary
edema, recurrent falls and shortness of breath.
 
His assessment on admission to the rehabilitation unit was a fall with closed
head injury and loss of consciousness, Parkinson dementia with acute delirium,
RLS, orthostatic hypotension, hypokalemia, history of AFib and cardiomyopathy. 
A complete description of his medical condition and history can be found in his
medical record.  Neuropsychological consultation was requested to provide
assistance in the assessment of cognitive and emotional status and provide
recommendations and services.
 
Prior to this most recent medical event, he was living at his home with the
assistance of his wife.  The patient is reported to have been both a 
and a  prior to shelter.  He is  with 1 child.  He has
3 sisters and 1 brother.  His family is described as very supportive.  His wife
is reported to have been helping in the management of medication and bill
payment. The deterioration in functioning is described as beginning about 2
years ago.  His employment was he retired as a city worker.  He reports having
 
 
 
[facility]
[address] [place]   [address]                     CONSULTATION                  
_______________________________________________________________________________
 
Name:       ANURAG LOPEZ                  Room #:         503-P       [facility] [place]  
M.R.#:      [id number]                       Account #:      [id number]  
Admission:  08/27/21    Attend Phys:    Ottoniel Gipson MD 
Discharge:              Date of Birth:  [birth year]  
                                                          Report #: [id number]
                                                                    [id number] 
_______________________________________________________________________________
 
had 2 Associate of Arts Degrees, one as a  and the other as an HVAC.
 
TECHNIQUES UTILIZED:  Clinical interview, review of medical records, staff
consultation and behavioral observation, family interview -- sister, mini
mental status exam 2 brief version.
 
EXAMINATION FINDINGS:  The patient was difficult to arouse.  He was unable to
accurately describe events surrounding his admission.  He reports having
fallen, but then thought he was to get on a plane.  The sister provided his
background and history.  He states that he has had visual hallucinations
associated with Parkinson's disease.  This patient does not report difficulty
with sleep.  Appetite, subjective anxiety, depression and memory are reported. 
The patient has had a medical marijuana card.
 
Performance on the MMSE 2 brief version was extremely low with a raw score of
5/16.  He was 3/3 for initial registration, 1/5 for orientation to time, 0/5
for orientation to place and 1/3 for immediate recall of 3 items after a brief
time delay and distraction.
 
The patient is presenting with severe cognitive disorder that is likely
multifactorial.  Contributing to this level of functioning, is very likely
Parkinson's disease related to dementia and traumatic brain injury from his
most recent fall.
DIAGNOSTIC IMPRESSION: 
 
Delirium, Hypoactive Acute
 
Major Neurocognitive Disorder (dementia), with Parkinsonian features and TBI,
without behaivor disorder, extent to be determined
 
RECOMMENDATIONS:  The patient will require 24-hour care to maintain safety. 
Assistance in the management of medication, finances and nutrition.  Continued
distraction and redirection of attention can be used when patient is agitated
or presenting with irritability.  He reports awareness of the visual
hallucinations from parkinsonian disease. Driving should be discontinued.  A
structured and scheduled routine will also be of benefit. Follow up
neuropsychological testing to clarify the severity of neurocognitive disorder.
 
Thank you very much for allowing me to provide the consultation on this
patient.
 
 
 
  <ELECTRONICALLY SIGNED>
   By: Vaibhav Lopez, PhD           
  09/03/21     1248
D: 08/30/21 1743                           _____________________________________
T: 08/31/21 0222                           Vaibhav Lopez, PhD             /nt

## 2021-09-03 NOTE — NUR
NOTED BP ELEVATED THIS AM, AND AM MEDS GIVEN AS ORDERED. BP RECHECK WITH
MANUAL CUFF AND PT RESTING IN BED: 126/78. PT UP WITH THERAPIES THIS AM, AND
COOPERATIVE, PLEASANT, ORIENTED X 3. ALARMS ON, AND TIMED VOIDING SCHEDULE
ENSUING TO FACILITATE CONTINENCE.

## 2021-09-03 NOTE — NUR
Cont. with dcp as needed, juan m gold following for hh needs and will
reach out to his wife, dc 9/10/21

## 2021-09-03 NOTE — PLAN
[facility]
[address]   [address]                     REHAB UNIT PLAN OF CARE       
_______________________________________________________________________________
 
Name:       ANURAG LOPEZ                  Room #:         503-P       ADM IN  
M.R.#:      [id number]                       Account #:      [id number]  
Admission:  08/27/21    Attend Phys:    Ottoniel Gipson MD 
Discharge:              Date of Birth:  [birth year]  
                                                          Report #: [id number]
                                                                    [id number] 
_______________________________________________________________________________
THIS REPORT FOR:  
 
cc:  Vaibhav Carpenter MD, Neal A. MD Smithson,Ottoniel [place] MD                                         ~
 
DATE OF SERVICE: 08/30/2021
 
PROGRESS NOTE AND OVERALL PLAN OF CARE
 
HISTORY OF PRESENT ILLNESS:  The patient was seen on the inpatient rehab cloud.  
He was noted to be found on the floor late yesterday.  No evidence of any 
trauma.  He is in a bed right across from the nurse's station.  He has been 
monitored regarding some confusion.  He seemed appropriate with me for basic 
questions this morning, followed commands.  Temperature 36.6, pulse 70, 
respirations 18, blood pressure 114/73.  He does have a history of Parkinson's 
disease.  Some rigidity and cogwheeling noted of the upper extremities at the 
elbows and wrists.  No focal calf swelling.  He is in no distress.  Transfers 
have been max assist with gait, mod assist 70 feet with a 4-wheeled walker.  In 
occupational therapy, upper body dressing is max assist with lower body 
dressing, max assist.  In speech therapy, he has severe cognitive deficits along
with severe memory deficits.  Of note is that he is being treated for urinary 
tract infection, greater than 100,000 Proteus on 08/29/2021.  He is on 
cefuroxime b.i.d.
 
ASSESSMENT:
1.  Fall prior to admission with closed head injury.
2.  Parkinson's disease.
3.  Suspected Parkinson's dementia with acute delirium.
4.  Urinary tract infection.
5.  Restless legs syndrome.
6.  Orthostatic hypotension.
7.  Hypokalemia.
8.  History of atrial fibrillation.
9.  Cardiomyopathy.
 
PLAN:  The patient is involved in inpatient rehabilitation.  The hospitalist
service is assisting regarding medical management.  The overall plan of care is
based on the pre-admit screen and information garnered from therapy
assessments.  1.  Estimated length of stay is probably around 10-14 days and
likely longer if warranted.  2.  Medical prognosis is reasonably good.  3. 
Anticipated interventions include the interdisciplinary acute inpatient
rehabilitation program.  4.  Anticipated functional outcomes would be for the
patient to improve as far as mobility and ADLs and cognition to hopefully be
able to return back to his home setting.  He was a premorbid front-wheeled
walker ambulator.  5.  Discharge destination would be back home with his wife. 
 
 
 
[address]   [address]                     REHAB UNIT PLAN OF CARE       
_______________________________________________________________________________
 
Name:       ANURAG LOPEZ                  Room #:         503-P       [facility] [place]  
M.R.#:      [id number]                       Account #:      [id number]  
Admission:  08/27/21    Attend Phys:    Ottoniel Gipson MD 
Discharge:              Date of Birth:  [birth year]  
                                                          Report #: [id number]
                                                                    [id number] 
_______________________________________________________________________________
 
There is an adult son in the  area and has a sister who is a retired nurse
who lives across the street from the patient.  6.  Expected therapy by
discipline includes PT, OT and speech 1 hour per day each 5 days a week
throughout the duration of the acute inpatient rehabilitation stay.
 
The patient's prognosis for significant practical improvement within a 
reasonable period of time appears good.  Given the patient's complex medical 
condition and risk of further medical complication, rehabilitation services 
could not be safely provided at the lower level of care such as a skilled 
nursing facility.
 
 
 
 
 
 
 
 
 
 
 
 
 
 
 
 
 
 
 
 
 
 
 
 
 
 
 
 
 
 
 
 
 
  <ELECTRONICALLY SIGNED>
   By: Ottoniel Gipson MD         
  09/03/21     1137
D: 08/30/21 0928                           _____________________________________
T: 08/30/21 1006                           Ottoniel Gipson MD           /nt

## 2021-09-04 VITALS — DIASTOLIC BLOOD PRESSURE: 102 MMHG | SYSTOLIC BLOOD PRESSURE: 150 MMHG

## 2021-09-04 VITALS — SYSTOLIC BLOOD PRESSURE: 131 MMHG | DIASTOLIC BLOOD PRESSURE: 92 MMHG

## 2021-09-04 NOTE — NUR
PT SITTING UP IN W/C THIS AM FINISHED BREAKFAST. PT KNOWS NAME, , PLACE AND
MONTH. PT LUNGS CLEAR. PT HAS BM . PT TOOK MEDS WHOLE WITH THIN WATER. PT
HAS LAP INESSA FOR SAFETY, PT CAN TAKE OFF IF NEEDED ON HIS OWN.

## 2021-09-04 NOTE — NUR
HOUSE SUPERVISOR WAS ABLE TO BE A NOTARY FOR FAMILY AT THIS TIME. PT HAS DPOA
PER WIFE AND SON PAPERWORK IN CHART.

## 2021-09-04 NOTE — NUR
FAMILY LEFT PT NEEDED TO USE BATHROOM. PT WAS ABLE TO STAND AT TOILET AND
VOID. PT THEN BACK TO W/C AND BED. BED ALARM ON.

## 2021-09-04 NOTE — NUR
ASSUMED CARE AT 1900 OF 9/3. PATIENT CONTINUES TO BE CONFUSED, AND ONLY A&O TO
SELF. PATIENT REPORTS NEEDING TO LEAVE THE BOAT, PATIENT IS REORIENTED TO
PLACE AND SITUATION, AND APPEARS AGITATED THAT HE CANNOT LEAVE. HS MEDICATIONS
AND PRN HALDOL ADMINISTERED AT HS. PATIENT APPEARED TO HAVE BEEN ABLE TO SLEEP
UNTIL ABOUT NIDNIGHT. AND BEGAN ASKING ABOUT LEAVING THE BOAT AGAIN, PATIENT
IS REORIENTED AND REDIRECTED TO SLEEP BECAUSE HE IS IN THE HOSPITAL AND WILL
HAVE THERAPY IN THE MORNING. APPEARS TO HAVE FALLEN ASLEEP AGAIN, URINAL AT
BEDSIDE. FALL PRECAUTIONS AND HOURLY ROUNDING IN PLACE. BED ALARM ON, CALL
LIGHT WITHIN REACH, WILL CONTINUE TO MONITOR.

## 2021-09-04 NOTE — NUR
WIFE HERE WITH EXTRA MEDICATION FOR HOME MED HE TAKES AT BEDTIME. SHE BROUGHT
PAPERS THAT NEEDED A NOTARY WITH HER AND HIS ID. SHE WAS HERE YESTERDAY AND
FORGOT TO BRING ID FOR NOTARY. NOTIFIED CHARGE NURSE TO SIGN, SHE WAS BUSY AND
WILL BE UP AS SOON AS SHE CAN.

## 2021-09-05 VITALS — DIASTOLIC BLOOD PRESSURE: 105 MMHG | SYSTOLIC BLOOD PRESSURE: 149 MMHG

## 2021-09-05 VITALS — DIASTOLIC BLOOD PRESSURE: 96 MMHG | SYSTOLIC BLOOD PRESSURE: 131 MMHG

## 2021-09-05 VITALS — SYSTOLIC BLOOD PRESSURE: 130 MMHG | DIASTOLIC BLOOD PRESSURE: 78 MMHG

## 2021-09-05 NOTE — NUR
Assumed pt care at 0700. Pt was alert and oriented to person and situation. Pt
is forgetful. Continent of bowel and bladder this shift. uses urinal. Took med
whole with thin liquid, no difficulty noted. Makes need known to staff. Call
for assistance appropriately.  Calm and cooperative with care. AT this time pt
is eating dinner. will continue to monitor.

## 2021-09-05 NOTE — NUR
ASSUMED CARE AT 1900 OF 9/4. PATIENT IS A&O TO PERSON, PLACE AND TIME.
INTERMITENTLY FORGETFUL. COMPLIANT WITH CARES, NO AGITATION NOTED SO FAR.
INCONTINENT OF BLADDER ONCE, AND WAS ABLE TO USE URINAL AT BED SIDE WITH
MINIMAL ASSIST FROM SIT TO STAND. HAD A SNACK AT BEDTIME, AND TOLERATED ALL
ORAL MEDS WHOLE WITH THIN LIQUIDS. APPEARS TO BE SLEEPING DURING HOURLY
ROUNDS, BED ALARM ON, CALL LIGHT WITHIN REACH. WILL CONTINUE TO MONITOR.

## 2021-09-06 VITALS — DIASTOLIC BLOOD PRESSURE: 78 MMHG | SYSTOLIC BLOOD PRESSURE: 138 MMHG

## 2021-09-06 VITALS — SYSTOLIC BLOOD PRESSURE: 116 MMHG | DIASTOLIC BLOOD PRESSURE: 69 MMHG

## 2021-09-06 LAB
BACTERIA-REFLEX: (no result) /HPF
BILIRUB UR-MCNC: NEGATIVE MG/DL
COLOR UR: YELLOW
KETONES UR STRIP-MCNC: NEGATIVE MG/DL
RBC # UR STRIP: (no result) /UL
SP GR UR STRIP: 1.01 (ref 1–1.03)
URINE CLARITY: (no result)
URINE GLUCOSE-RANDOM*: NEGATIVE
URINE LEUKOCYTES-REFLEX: (no result)
URINE NITRITE-REFLEX: NEGATIVE
URINE PROTEIN (DIPSTICK): (no result)
URINE WBC-REFLEX: (no result) /HPF (ref 0–5)
UROBILINOGEN UR STRIP-ACNC: 0.2 E.U./DL (ref 0.2–1)

## 2021-09-06 NOTE — NUR
ASSUMED CARE AT 1900 OF 9/5. PATIENT IS A&O TO PERSON AND SITUATION. DENIES
PAIN OR SOB. ABLE TO MAKE NEEDS KNOWN. MODERATE ASSIST USING GB AND WALKER,
REQUIRES CUING WITH TRANSFER AND AMBULATION. URINAL AT BEDSIDE, EXHIBITING
URGENCY AND FREQUNECY. REQUESTED FOR A SNACK AT HS, AND TOLERATED MEDS WHOLE
WITH THIN LIQUIDS. FALL PRECAUTIONS IN PLACE, CALL LIGHT WITHIN REACH. WILL
CONTINUE TO MONITOR.

## 2021-09-06 NOTE — NUR
REQUESTING TO URINATE/HAVE BM MULTIPLE TIMES TODAY-IS VOIDING IN MODERATE
AMOUNTS-URINE NOTED TO BE THICK,FOUL SMELLING AND COTY COLORED. LABILE MOOD
WITH FREQUENT EPISODES OF OUTBURSTS/VERBAL AGITATION. C/O "NOT FEELING TOO
WELL BUT IS UNABLE TO BE MORE SPECIFIC "LUNGS CLEAR-BS ACTIVE X4-BM THIS AM-
URINE AS NOTEDT ABOVE-NO DISTENSION/TENDERNESS ON PALPATION. NO COUGH-APICAL
PULSE 84 AND REGULAR-. O2 SATS 96 PERCENT ON RA- PERCENT OF BREAKFAST
AND 75 PERCENT LUNCH. REQUIRES ASSIST X2 TO TRANSFER TO AND FROM BED/TOILET
ETC. REMAINS ON HIGH FALLS RISK-BED EXIT ALARM,CHAIR ALARM IN PLACE

## 2021-09-07 VITALS — SYSTOLIC BLOOD PRESSURE: 101 MMHG | DIASTOLIC BLOOD PRESSURE: 67 MMHG

## 2021-09-07 VITALS — SYSTOLIC BLOOD PRESSURE: 88 MMHG | DIASTOLIC BLOOD PRESSURE: 59 MMHG

## 2021-09-07 LAB
ALBUMIN SERPL-MCNC: 3.2 G/DL (ref 3.4–5)
ALT SERPL-CCNC: 11 U/L (ref 30–65)
ANION GAP SERPL CALC-SCNC: 9 MMOL/L (ref 7–16)
AST SERPL-CCNC: 12 U/L (ref 15–37)
BASOPHILS NFR BLD AUTO: 0.3 % (ref 0–2)
BILIRUB SERPL-MCNC: 0.7 MG/DL (ref 0.2–1)
BUN SERPL-MCNC: 34 MG/DL (ref 7–18)
CALCIUM SERPL-MCNC: 8.1 MG/DL (ref 8.5–10.1)
CHLORIDE SERPL-SCNC: 105 MMOL/L (ref 98–107)
CO2 SERPL-SCNC: 24 MMOL/L (ref 21–32)
CREAT SERPL-MCNC: 1.7 MG/DL (ref 0.7–1.3)
EOSINOPHIL NFR BLD: 1.1 % (ref 0–3)
ERYTHROCYTE [DISTWIDTH] IN BLOOD BY AUTOMATED COUNT: 12.9 % (ref 10.5–14.5)
GLUCOSE SERPL-MCNC: 108 MG/DL (ref 74–106)
GRANULOCYTES NFR BLD MANUAL: 83.7 % (ref 36–66)
HCT VFR BLD CALC: 40.8 % (ref 42–52)
HGB BLD-MCNC: 14.3 GM/DL (ref 14–18)
LYMPHOCYTES NFR BLD AUTO: 9.5 % (ref 24–44)
MAGNESIUM SERPL-MCNC: 1.9 MG/DL (ref 1.8–2.4)
MCH RBC QN AUTO: 32.8 PG (ref 26–34)
MCHC RBC AUTO-ENTMCNC: 35.1 G/DL (ref 28–37)
MCV RBC: 93.2 FL (ref 80–100)
MONOCYTES NFR BLD: 5.4 % (ref 1–8)
NEUTROPHILS # BLD: 8.3 THOU/UL (ref 1.4–8.2)
PLATELET # BLD: 153 THOU/UL (ref 150–400)
POTASSIUM SERPL-SCNC: 4.5 MMOL/L (ref 3.5–5.1)
PROT SERPL-MCNC: 6.2 G/DL (ref 6.4–8.2)
RBC # BLD AUTO: 4.37 MIL/UL (ref 4.5–6)
SODIUM SERPL-SCNC: 138 MMOL/L (ref 136–145)
WBC # BLD AUTO: 10 THOU/UL (ref 4–11)

## 2021-09-07 NOTE — NUR
Team meeting, recommendation:  more confusion. UTI on po abx, pending c & s.
low b/p and soa with active, back on oxygen, encourage use IS and wean o2. min
to mod for dressing on lower body, transfer part mod assist, u step walker
with min to mod assist. mod to sever cognition and memory. needs assist with
pills and bills. 24 hour supervison with ques. Dc 9/14 juan m gold (
pt, ot, st, nursing, sw). No driving till cleared by MD.

## 2021-09-07 NOTE — NUR
PT ASSESSMENT COMPLETED AND VSS. MEDS GIVEN AS ORDERED AND WELL TOLERATED. UA
RESULTS CALLED TO NP UBALDO AND INFORMED HER OF PATIENTS THICK MUCUS LIKE
URINE. FOLLOWED ORDERS. PT AWAKE DURING THE NIGHT EVEN AFTER SLEEP MEDICATION.
INC OF LARGE AMOUNT OF STRONG SMELLING URINE. MYRTLE CARE GIVEN. CREAM APPLIED
TO RED BOTTOM. FUNGAL SCROTUM - FUNGAL BARRIER CREAM APPLIED. ASST WITH
REPOSITION FOR COMFORT. WILL CONTINUE TO MONITOR FREQUENTLY.

## 2021-09-07 NOTE — NUR
ASSUMED CARE AT 0700. PATIENT IS ALERT AND ORIENTED X1 TO PERSON. PATIENT
MOREIRA'S,  ARE EQUAL WITH OCCASIONAL TREMORS. PATIENT HAS HX OF PARKINSONS
DISEASE.  ABD IS SOFT WITH BSX4. PATIENT HAD BM YESTERDAY. PATIENT HAD SOME
SOB WITH O.T.  PT PLACED ON 02 AT 1l per N/C. PATIENTS BOTTOM IS RED FROM
INCONTINENCE. Z GUARD APPLIED. PATIENT VOIDING IN SMALL AMOUNTS AND HAS
BURNING ON URINATION. PATIENTS HAS UTI AND CONTINUES ON PO ABT. BP LOW THIS
AM. ENCOURGED PO FLIUDS. UP IN W/C AND OUT TO DINING ROOM FOR MEALS. FALL AND
SAFETY PROTOCOLS IN PLACE. DENIES PAIN. CONTINUES TO PROGRESS SLOWLY TOWARDS
D/C GOALS. WILL CONTINUE TO MONITER.

## 2021-09-08 VITALS — SYSTOLIC BLOOD PRESSURE: 105 MMHG | DIASTOLIC BLOOD PRESSURE: 69 MMHG

## 2021-09-08 VITALS — DIASTOLIC BLOOD PRESSURE: 57 MMHG | SYSTOLIC BLOOD PRESSURE: 92 MMHG

## 2021-09-08 NOTE — NUR
assumed care approx 1900 evening 9/7. pt lying in bed with head of bed
elevated at change of shift. pt confused and forgetful however cooperative on
this night shift so far. pt voiding small amt urine in urinal. pt took hs meds
with water tolerating well. pt appears to be sleeping soundly. bed alarm on
and call light in reach. will continue to monitor.

## 2021-09-08 NOTE — NUR
ASSUMED CARE AT 0700. PATIENT IS ALERT AND ORIENTED X1, BUT COOPERATIVE.
PATIENT MOREIRA'S,  ARE EQUAL. LUNGS ARE DEMINISHED. ENCOURAGED I.S. PATIENT
CONTINUES ON ORAL ABT FOR UTI. PATIENT IS TOLERATING ABT WITHOUT ADVERSE
AFFECTS. PATIENT IS UP WITH GAIT BELT AND WALKER TO BATHROOM TO VOID ORANGE
COLORED URINE. FALL AND SAFETY PROTOCOLS IN PLACE. UP TO THE W/C AND OUT TO
THE DINING ROOM FOR MEALS. DENIES PAIN AT THIS TIME. CONTINUES TO PROGRESS
TOWARDS D/C GOALS. WILL CONTINUE TO MONITER.

## 2021-09-09 VITALS — DIASTOLIC BLOOD PRESSURE: 70 MMHG | SYSTOLIC BLOOD PRESSURE: 103 MMHG

## 2021-09-09 VITALS — SYSTOLIC BLOOD PRESSURE: 105 MMHG | DIASTOLIC BLOOD PRESSURE: 69 MMHG

## 2021-09-09 NOTE — NUR
PT ASSESSMENT COMPLETED AND VSS. MEDS GIVEN AS ORDERED AND WELL TOLERATED.
ASST WITH REPOSITION FOR COMFORT. MYRTLE CARE PROVIDED. SAT WNL ON RA. SLEEP
MEDICATION HELPFUL. PT SLEEPING WELL AT THIS TIME. WILL CONTINUE TO MONITOR
FREQUENTLY.

## 2021-09-09 NOTE — NUR
PT LYING IN BED AFTER THERAPY. PT SEEMS TIRED. PT STATED HE NEEDED TO USE THE
BATHROOM FOR BM. PT TRANSFERED FROM BED TO W/C AND ENCOURAGED PT TO STRAIGHTEN
HIS KNEES. PT ABLE TO TRANSFER FROM W/C TO TOILET. PT HAD MED SOFE BM, NOTICED
BLOOD WHEN WHIPPING. PT SEEMS TO HAVE AN ACTIVE BLEEDING HEMMOROID, PT SKIN
INTACT TO BUTTOCKS. PT HAS BRIEF PLACED ON. PT LUNGS CLEAR. PT TOOK MEDS WHOLE
WITH THIN WATER, PT DENIES ANY PAIN.

## 2021-09-09 NOTE — NUR
ASSUMED CARE AT 1900 OF 9/8. PATIENT IS A&O TO PERSON AND SITUATION, DENIES
PAIN OR SOB. COOPERATIVE WITH CARES AND MEDS. URINAL AT BED SIDE, CONTINUES TO
EXHIBIT VOIDING FREQUENCY, URINE IS NOW CLEAR YELLOW, WITH NOTED SEDIMENTS.
PATIENT IS WAKING FREQUENTLY TO USE URINAL IN BED. MELATONIN WAS ADMINISTERED
AT HS AS A SLEEP AID. SLEEPING IN BED NOW, FALL PRECAUTIONS IN PLACE AND CALL
LIGHT WITHIN REACH. WILL CONTINUE TO MONITOR.

## 2021-09-09 NOTE — NUR
PT WENT BATHROOM TO VOID. BLADDER SCANNED PER PEMA SEVERINO REQUEST, PT SHOWED
199ML OF URINE IN BLADDER POST VOID.

## 2021-09-10 VITALS — DIASTOLIC BLOOD PRESSURE: 105 MMHG | SYSTOLIC BLOOD PRESSURE: 155 MMHG

## 2021-09-10 VITALS — SYSTOLIC BLOOD PRESSURE: 143 MMHG | DIASTOLIC BLOOD PRESSURE: 73 MMHG

## 2021-09-10 NOTE — NUR
Nutrition followup: pt continues to eat very well on rehab unit, % of
meals. Psych continues to follow. Hx parkinsons, confusion improved. no weight
since 8/27. BM 9/9. ST following pt and diet now downgraded to mechanically
altered chopped with honey thick liquids.  Diet change has not affected
intake. On vitamin D, folate and mirtazapine. Plan to D/C early next week.
Continue as low nutrition risk.

## 2021-09-10 NOTE — NUR
PT WORKING WITH THERAPY AND EATING BREAKFAST. PT TOOK MEDS WITHOUT ANY ISSUES.
PT DENIES ANY PAIN. PT UP WITH WALKER THIS AM AND HAS A BASEBALL HELMENT FOR
HEAD PROTECTION. PT HAS BEEN COMPLAINING OF DIZZINESS WHEN STANDING. PT HAS
KNEES BENT WHEN AMBULATING AND ENCOURAGED TO STAND UP WHEN WALKING. PT LUNGS
CLEAR.

## 2021-09-10 NOTE — NUR
I have reviewed the documentation by RABIA CARLTON from 09/10/21 to
09/10/21 and I concur with it.
SOFI RUTLEDGE M

## 2021-09-11 VITALS — DIASTOLIC BLOOD PRESSURE: 65 MMHG | SYSTOLIC BLOOD PRESSURE: 100 MMHG

## 2021-09-11 VITALS — DIASTOLIC BLOOD PRESSURE: 100 MMHG | SYSTOLIC BLOOD PRESSURE: 160 MMHG

## 2021-09-11 VITALS — DIASTOLIC BLOOD PRESSURE: 79 MMHG | SYSTOLIC BLOOD PRESSURE: 160 MMHG

## 2021-09-11 VITALS — DIASTOLIC BLOOD PRESSURE: 91 MMHG | SYSTOLIC BLOOD PRESSURE: 159 MMHG

## 2021-09-11 VITALS — DIASTOLIC BLOOD PRESSURE: 101 MMHG | SYSTOLIC BLOOD PRESSURE: 166 MMHG

## 2021-09-11 VITALS — SYSTOLIC BLOOD PRESSURE: 163 MMHG | DIASTOLIC BLOOD PRESSURE: 93 MMHG

## 2021-09-11 VITALS — SYSTOLIC BLOOD PRESSURE: 123 MMHG | DIASTOLIC BLOOD PRESSURE: 78 MMHG

## 2021-09-11 LAB
ANION GAP SERPL CALC-SCNC: 9 MMOL/L (ref 7–16)
APTT BLD: 38.4 SECONDS (ref 24.5–32.8)
BASOPHILS NFR BLD AUTO: 0.5 % (ref 0–2)
BUN SERPL-MCNC: 24 MG/DL (ref 7–18)
CALCIUM SERPL-MCNC: 8.6 MG/DL (ref 8.5–10.1)
CHLORIDE SERPL-SCNC: 109 MMOL/L (ref 98–107)
CO2 SERPL-SCNC: 28 MMOL/L (ref 21–32)
CREAT SERPL-MCNC: 1.3 MG/DL (ref 0.7–1.3)
EOSINOPHIL NFR BLD: 1.5 % (ref 0–3)
ERYTHROCYTE [DISTWIDTH] IN BLOOD BY AUTOMATED COUNT: 12.6 % (ref 10.5–14.5)
GLUCOSE SERPL-MCNC: 91 MG/DL (ref 74–106)
GRANULOCYTES NFR BLD MANUAL: 71.4 % (ref 36–66)
HCT VFR BLD CALC: 38.9 % (ref 42–52)
HGB BLD-MCNC: 13.5 GM/DL (ref 14–18)
INR PPP: 1.08
LYMPHOCYTES NFR BLD AUTO: 20.2 % (ref 24–44)
MAGNESIUM SERPL-MCNC: 1.9 MG/DL (ref 1.8–2.4)
MCH RBC QN AUTO: 32.3 PG (ref 26–34)
MCHC RBC AUTO-ENTMCNC: 34.8 G/DL (ref 28–37)
MCV RBC: 93 FL (ref 80–100)
MONOCYTES NFR BLD: 6.4 % (ref 1–8)
NEUTROPHILS # BLD: 3.8 THOU/UL (ref 1.4–8.2)
PLATELET # BLD: 150 THOU/UL (ref 150–400)
POTASSIUM SERPL-SCNC: 4.4 MMOL/L (ref 3.5–5.1)
PROTHROMBIN TIME: 11.7 SECONDS (ref 10.5–12.1)
RBC # BLD AUTO: 4.19 MIL/UL (ref 4.5–6)
SODIUM SERPL-SCNC: 146 MMOL/L (ref 136–145)
WBC # BLD AUTO: 5.3 THOU/UL (ref 4–11)

## 2021-09-11 NOTE — NUR
1630 SWALLOW EVAL COMPLETED. PATIENT IS ALERT AND AWAKE AND ABLE TO FOLLOW
COMMANDS. PATIENT WAS GIVEN THICKENED WATER. HE WAS ABLE TO SWALLOW WITHOUT
DIFFICULTY OR WITH ANY COUGH. NEXT I TRIED APPLESAUCE AND PATIENT WAS ABLE TO
SWALLOW APPLESAUCE WITHOUT ANY COUGH. PATIENT WAS TRIED ON 1700 PILLS AND WAS
ABLE TO SWALLOW PILLS WITHOUT COUGH OR DIFFICULTY. DINNER TRAY SERVED AT 1645.
PATIENT WAS ABLE TO % OF DINNER. PATIENT WAS MONITERED FREQUENTLY.

## 2021-09-11 NOTE — NUR
assumed care approx 1900 evening 9/10. pt up to bathroom to void and thought
he had to have bm which he did not. back to bed and voiding per urinal. pt
took hs meds with pudding tolerating well. pt appears to be sleeping soundly
off and on. bed alarm on and call light in reach. will continue to monitor.

## 2021-09-11 NOTE — NUR
1530 IV KEPPRA 500MG /5 ML ORDERED BY M.D. X 1 DOSE. MED OBTAINED FROM
PHARMACY. AFTER ADMINISTRATION PATIENT WOKE UP AND ASKED "WHAT HAPPENDED TO
ME?"  THIS WRITER TOLD THE PATIENT HE HAD CHANGE IN HIS LOC AND WE DID A "CODE
STROKE" PATIENT IS ALERT AND AWAKE AND BACK TO BASELINE. WILL DO SWALLOW EVAL
PRIOR TO DINNER.

## 2021-09-11 NOTE — NUR
1415 THIS WRITER ENTERED PATIENTS ROOM TO GIVE HIM HIS 1400 MEDS. PATIENT HAD
CHANGE IN LOC , VS WERE TAKEN AND STABLE, /98 , T 98, 96% RA, P 75 R16
. NSG SUPERVISER HERE TO EVALUATE PATIENT. PATIENT OPENED HIS EYES, AND
WAS ABLE TO LIGHTLY SQUEEZE HAND. CODE STROKE PROTOCOL INITATED PER DR. SCHROEDER. DR. ROSS HERE. RESPIRATORY HERE. DR. BURT ON CALL FOR [facility].
NOTIFIED ANSWERING SERVICE. DR. BURT PAGED. PATIENT TO CT SCANNER PER BED.

## 2021-09-11 NOTE — NUR
ASSUMED CARE AT 0700. PATIENT IS ALERT AND ORIENTED X1 TO SELF. PATIENT MOREIRA'S,
 ARE EQUAL. LUNGS ARE CLEAR AND DEMINISHED. ABD IS SOFT WITH BSX4.
PATIENT CONTINUES ON ABT FOR UTI. F3VCBRH DARK COTY COLORED URINE PER URINAL.
PATIENT IS INCONTINENT AT TIMES. ENCOURAGED PO HONEY THICK LIQUIDS. FALL AND
SAFETY PROTOCOLS IN PLACE. DENIES PAIN AT THIS TIME. CONTINUES TO PROGRESS
SLOWLY TOWARDS D/C GOALS. WILL CONTINUE TO MONITER.

## 2021-09-11 NOTE — NUR
1700 DR. AUGUSTIN, NEUROLOGIST HERE TO SEE PATIENT.  SHE DOES NOT THINK THAT
THIS WAS A STROKE OR SEIZURE. (SEE HER NOTE). [facility] STROKE EVAL DONE ON THE
PATIENT BY DR. AUGUSTIN. PATIENT REMAINS AWAKE AND ALERT. WILL CONTINUE TO
MONITER.

## 2021-09-12 VITALS — SYSTOLIC BLOOD PRESSURE: 111 MMHG | DIASTOLIC BLOOD PRESSURE: 74 MMHG

## 2021-09-12 VITALS — SYSTOLIC BLOOD PRESSURE: 116 MMHG | DIASTOLIC BLOOD PRESSURE: 78 MMHG

## 2021-09-12 VITALS — SYSTOLIC BLOOD PRESSURE: 125 MMHG | DIASTOLIC BLOOD PRESSURE: 80 MMHG

## 2021-09-12 LAB
BE(VIVO): -1.9 MMOL/L
CHOLEST SERPL-MCNC: 77 MG/DL (ref ?–200)
HCO3 BLD-SCNC: 21.9 MMOL/L (ref 22–26)
HDLC SERPL-MCNC: 24 MG/DL (ref 40–?)
LDLC SERPL-MCNC: 32 MG/DL (ref ?–100)
PCO2 BLD: 34.6 MMHG (ref 35–45)
PO2 BLD: 83.1 MMHG (ref 80–100)
TC:HDL: 3.2 RATIO
TRIGL SERPL-MCNC: 105 MG/DL (ref ?–150)
VLDLC SERPL CALC-MCNC: 21 MG/DL (ref ?–40)

## 2021-09-12 NOTE — EKG
[address] [employer]
[place]  [address]
Phone:  [phone]                    ELECTROCARDIOGRAM REPORT      
_______________________________________________________________________________
 
Name:       ANURAG LOPEZ                  Room #:         503-P       ADM IN  
M.R.#:      [id number]     Account #:      [id number]  
Admission:  21    Attend Phys:    Ottoniel Gipson MD 
Discharge:              Date of Birth:  [birth year]  
                                                          Report #: [id number]
   [id number]
_______________________________________________________________________________
                          [facility]
                                       
Test Date:    2021               Test Time:    11:54:09
Pat Name:     ANURAG LOPEZ             Department:   
Patient ID:   [id number]            Room:         503 P
Gender:       M                        Technician:   HERB
:          [id number]               Requested By: Lenora Titus
Order Number: [id number] MD:   Catracho Canseco
                                 Measurements
Intervals                              Axis          
Rate:         70                       P:            
AL:           49                       QRS:          -78
QRSD:         169                      T:            78
QT:           458                                    
QTc:          495                                    
                           Interpretive Statements
Atrial-paced rhythm
Nonspecific IVCD with LAD
Left ventricular hypertrophy
Compared to ECG 2021 15:05:02
No significant change
Electronically Signed On 2021 11:55:52 CDT by Catracho Canseco
[email or web address]
 
 
 
 
 
 
 
 
 
 
 
 
 
 
 
 
 
 
 
 
  <ELECTRONICALLY SIGNED>
   By: Catracho Canseco MD               
  21     1155
D: 21 1154                           _____________________________________
T: 21 1154                           MD PADMINI Hurst

## 2021-09-12 NOTE — NUR
ASSUMED CARE AT 0700. PATIENT IS ALERT AND ORIENTED TO PERSON ONLY. PATIENT
MOREIRA'S,  ARE EQUAL. LUNGS ARE DEMINISHED.  ABD IS SOFT WITH BSX4. PATIENT
IS VOIDING COTY COLORED URINE PER URINAL AT TIMES. PATIENT IS INCONTINENT OF
URINE AT TIMES. PATIENT IS UP WITH ASSIST OF 1 STAFF AND GAIT BELT TO
AMBULATED WITH P.T. FALL AND SAFETY PROTOCOLS IN PLACE. S.L. IN PATIENTS LEFT
AC IS PATENT AND INTACT WITHOUT REDNESS OR SWELLING. DENIES PAIN AT THIS TIME.
CONTINUES TO PROGRESS SLOWLY TOWARDS D/C GOALS. WILL CONTINUE TO MONITER.

## 2021-09-12 NOTE — NUR
PT ALERT AND ORIENTED X 1.  VOIDING PER URINAL.  INCONT AT TIMES.  PT TOOK HS
MEDS WITH HONEY THICK LIQUIDS WITHOUT DIFFICULTY.  PT DENIES PAIN OR
DISCOMFORT.  MELATONIN GIVEN AT HS FOR SLEEP.  PT IS AWAKE AT THIS TIME.  BED
ALARM ON FOR SAFETY.  PT CHECKED ON HOURLY ROUNDS.

## 2021-09-12 NOTE — NUR
1030 D5W ORDERED BY DR. SCHROEDER TO RUN AT 50CC/HR. IVF INFUSING PER IV IN THE
PATIENTS RIGHT AC. PATIENT NOTED TO HAVE LEFT FACIAL DROOP AND SLURRED SPEECH.
DR. SCHROEDER NOTIED. . VS /74 P70 R16 , 02 SAT 97% ON RA. DR. DWYER NOTIFIED. ELYSIA, HOUSE SUPERVISOR NOTIFIED AND ON THE FLOOR TO
EVALUATE PATIENT. STAT CT SCAN ORDERED PER DR. BURT.  STAT CT OF HEAD
REPEATED. COMPARED TO YESTERDAYS CT THEIR WAS NO CHANGE. DR. BURT ORDERED
STAT MRI.  PATIENT HAS PACEMAKER AND C.T. CAN'T BE DONE DUE TO NO INFORMATION
ON PATIENTS PACEMAKER BEING AVAILABLE. WIFE WAS NOTIFIED OF PATIENT'S STATUS
BY DR. BURT. THE INFORMATION ON THE PATIENTS PACEMAKER WILL HAVE TO BE
OBTAINED IN AM FROM DR. MCGEE'S OFFICE. MRI WILL BE SCED FOR MONDAY AFTER
AFTER THIS INFORMATION IS OBTAINED. PATIENT STATES " HE IS HUNGRY". PER
 IF PATIENT PASSES BEDSIDE EVAL. HE CAN EAT. PATIENT WAS GIVEN A
BEDSIDE EVAL AND HAD NO DIFFICULTY WITH SWALLOWING. PATIENT WAS OK'D TO EAT
LUNCH.  PATIENT IS NOT HAVING ANY DIFFICULTIES EATING AT THIS TIME. WE WILL
CONTINUE TO MONITER HIM CLOSELY.
ABLE TO HAVE T.P.A.  R/T HIM BEING ON XARELTO P.O. PER ELYSIA PATIENT WILL STAY
ON 5N AND WE WILL MONITER HIM CLOSELY.

## 2021-09-12 NOTE — HC
[facility] [place], [place]
[address] [place]   [address]                     CONSULTATION                  
_______________________________________________________________________________
 
Name:       ANURAG LOPEZ                  Room #:         503-P       ADM IN  
M.R.#:      [id number]                       Account #:      [id number]  
Admission:  08/27/21    Attend Phys:    Ottoniel Gipson MD 
Discharge:              Date of Birth:  [birth year]  
                                                          Report #: [id number]
                                                                    [id number] 
_______________________________________________________________________________
THIS REPORT FOR:  
 
cc:  Vaibhav Carpenter MD, Neal A. MD [place], Roxane S. DO                                          ~
 
 
NEUROLOGY CONSULTATION
 
HISTORY OF PRESENT ILLNESS:  The patient is a 71-year-old man who was admitted 
to rehab after falling from a standing height.  The patient hit his head on the 
ground.  A CT scan of the head was negative.  Afterwards, the patient had 
shoulder pain, but an x-ray was negative.  He also had a CT scan of the cervical
spine, but they showed no acute process.  The patient had acute delirium.  
Psychiatry was consulted.  He required restraint and antipsychotic medication.  
Now that the delirium has improved, he is able to follow commands.  He is 
admitted to [address] [place] [address] for further care.
 
The patient lives at home with his wife who is wheelchair bound.  There is an 
adult son who lives in the area.  His sister is a retired nurse and lives across
the street from the patient.
 
Today, the patient was seen at 1:30 and was well.  He reported being tired.  
Earlier that day, he had worked with physical therapy on the bicycle for 15 
minutes.  When the nurse went to check on the patient at 2:00 p.m., the patient 
was just barely able to open his eyes and was basically minimally responsive.  A
code stroke was called.  A CT scan of the head was done and showed no acute 
intracranial process.  A CT angiogram was done and was also unremarkable.  Lab 
work was not done at the time of the event with the exception of glucose, which 
was 119.  Dr. Quiñones saw the patient and ordered Keppra 500 mg followed by 250
mg b.i.d.
 
PAST MEDICAL HISTORY:  Hypertension, left bundle branch block, Parkinson's 
disease, sick sinus syndrome, atrial fibrillation, autonomic dysfunction, and 
has orthostatic hypotension.
 
PAST SURGICAL HISTORY:  Pacemaker, AV node ablation, bilateral cataracts.
 
MEDICATIONS:  Ropinirole 3 mg 6 mg at bedtime, sotalol 120 mg b.i.d., 
carbidopa/levodopa 250 t.i.d., Depakote 250 mg b.i.d., Flomax 0.4 mg daily, 
folic acid 1 mg daily, gabapentin 300 mg t.i.d., amantadine at bedtime, 
levofloxacin 500 mg daily, melatonin 10 mg at bedtime, mirtazapine 15 mg at 
bedtime, pantoprazole 40 mg daily, Xarelto 20 mg daily, ropinirole 1 mg t.i.d., 
Senokot 4 tablets b.i.d., Synthroid 75 mcg daily.
 
ALLERGIES:  BEE STINGS.
 
 
 
[address]   [address]                     CONSULTATION                  
_______________________________________________________________________________
 
Name:       ANURAG LOPEZ                  Room #:         503-P       [facility] [place]  
M.R.#:      [id number]                       Account #:      [id number]  
Admission:  08/27/21    Attend Phys:    Ottoniel Gipson MD 
Discharge:              Date of Birth:  [birth year]  
                                                          Report #: [id number]
                                                                    [id number] 
_______________________________________________________________________________
 
 
PHYSICAL EXAMINATION:
VITAL SIGNS:  Temperature 36.7, pulse rate 70, respiratory rate 16, blood 
pressure 159/91, bedside pulse oximetry 97% on room air.
NEUROLOGIC:  Cranial nerves II-XII are grossly intact.  Motor exam demonstrates 
symmetrical strength in all 4 extremities with tone and bulk normal.  Reflexes 
trace.  Plantar responses flexor.  Coordination demonstrates intact finger to 
nose.  Gait was not tested.
 
LABORATORY DATA:  Earlier this morning, hematology:  White blood cell count 5.3,
hemoglobin 13.5, hematocrit 38.9, MCV 93, platelet count 150,000.  INR 1.08.  
Urinalysis as of 09/06, 2+ blood, 3+ leukocyte esterase.  Chemistry:  Sodium 
146, potassium 4.4, chloride 109, carbon dioxide 28, BUN 24, creatinine 1.3, GFR
54, glucose 91, calcium 8.6, magnesium 1.9.  B12 is 286.  Vitamin D 17.9.  Folic
acid 7.1.
 
IMPRESSION AND PLAN:  This patient had an episode of altered mental status.  
This is not secondary to stroke.  The CT angiogram and CTA were negative and the
patient has a nonfocal neurological examination.
 
The patient has been started on Keppra 250 mg b.i.d. after receiving a 500 mg 
bolus.  I will order an EEG for Monday morning.
 
Unfortunately, the patient tells me he is not sleeping well at night and this 
needs to be addressed, so that the patient gets a good night's sleep and is not 
drowsy during the day.
 
I recommend a cardiology consult to make sure the patient does not have an 
underlying cardiac arrhythmia.  He has a known history of atrial fibrillation 
and has a pacemaker.
 
I thank you for your kind referral of the patient and I will check on him 
tomorrow.
 
 
 
 
 
 
 
 
 
 
  <ELECTRONICALLY SIGNED>
   By: Lenora Titus DO          
  09/12/21     0836
D: 09/11/21 1623                           _____________________________________
T: 09/11/21 2308                           Lenora Titsu DO            /nt

## 2021-09-13 VITALS — DIASTOLIC BLOOD PRESSURE: 61 MMHG | SYSTOLIC BLOOD PRESSURE: 102 MMHG

## 2021-09-13 VITALS — DIASTOLIC BLOOD PRESSURE: 62 MMHG | SYSTOLIC BLOOD PRESSURE: 106 MMHG

## 2021-09-13 LAB
ALBUMIN SERPL-MCNC: 3 G/DL (ref 3.4–5)
ANION GAP SERPL CALC-SCNC: 10 MMOL/L (ref 7–16)
BUN SERPL-MCNC: 18 MG/DL (ref 7–18)
CALCIUM SERPL-MCNC: 8.4 MG/DL (ref 8.5–10.1)
CHLORIDE SERPL-SCNC: 104 MMOL/L (ref 98–107)
CO2 SERPL-SCNC: 27 MMOL/L (ref 21–32)
CREAT SERPL-MCNC: 1.3 MG/DL (ref 0.7–1.3)
GLUCOSE SERPL-MCNC: 100 MG/DL (ref 74–106)
MAGNESIUM SERPL-MCNC: 1.9 MG/DL (ref 1.8–2.4)
PHOSPHATE SERPL-MCNC: 3.4 MG/DL (ref 2.5–4.9)
POTASSIUM SERPL-SCNC: 4.1 MMOL/L (ref 3.5–5.1)
SODIUM SERPL-SCNC: 141 MMOL/L (ref 136–145)

## 2021-09-13 NOTE — NUR
FAMILY HERE TODAY TO GET EDUCATED ON HONEY THICK LIQUIDS. FOOD CHOICES WAS
GONE OVER FOR WHAT WOULD BE APPROPREATE TO EAT. PT GETTING ECHO AT THIS TIME.

## 2021-09-13 NOTE — NUR
CALLED DR. GUTIERREZ OFFICE TO GET PACEMAKER INFORMATION. THEY WILL HAVE TO
CALL BACK, DID SAY THAT THE PACEMAKER IS A DUAL LEAD CHAMBER.

## 2021-09-13 NOTE — NUR
PT SITTING OUT IN DINNING ROOM THIS AM WITH ST. PT DENIES ANY PAIN. NOTICED
LEFT DROOP TO FACE. PT WANTED TO TAKE MEDS WITHOUT WAITING FOR ST TO GET
APPLESAUCE. PT LUNGS CLEAR. PT DRINKING NECTOR THICK LIQUIDS WITHOUT ANY
ISSUES. PT UP WITH WALKER. PT DID TAKE MEDS IN APPLESAUCE WITHOUT ANY
PROBLEMS.

## 2021-09-13 NOTE — NUR
PT ASSESSMENT COMPLETED AND VSS. MEDS GIVEN AS ORDERED AND WELL TOLERATED.
NEURO CHECK WAS BASELINE FOR PT THIS EVENING. PT COMMUNICATING WELL WITH
STAFF. PT HAVING TROUBLE SLEEPING EVEN AFTER SLEEP MEDICATION. VOIDING LARGE
AMOUNT OF YELLOW URINE PER URINAL. PT ABLE TO PULL HIMSELF UP IN THE BED
DURING THE NIGHT.  ASST WITH REPOSITION FOR COMFORT. PT RESTING WELL AT THIS
TIME. WILL CONTINUE TO MONITOR FREQUENTLY.

## 2021-09-13 NOTE — NUR
Cm visited with physical therapy and wife, r/t question and extra assist in
home. Cm checked with wife if [facility] would be able [facility] help out. He is not on
service with any [facility]. Re-education on senior blue book and private duty.
[facility] has accept for hh needs, BPCI. Will cont following as
needed for dc needs.

## 2021-09-13 NOTE — 2DMMODE
[facility]
[address] [place]   [address]                   2 D/M-MODE ECHOCARDIOGRAM     
_______________________________________________________________________________
 
Name:       ANURAG LOPEZ                  Room #:         503-P       ADM IN  
M.R.#:      [id number]                       Account #:      [id number]  
Admission:  21    Attend Phys:    Ottoniel Gipson MD 
Discharge:              Date of Birth:  [birth year]  
                                                          Report #: [id number]
                                                                    [id number]
_______________________________________________________________________________
THIS REPORT FOR:  
 
cc:  Vaibhav Carpenter MD, Neal A. MD Lundgren, Craig H. MD [facility]                                        
                                                                       ~
 
--------------- APPROVED REPORT --------------
 
 
Study performed:  2021 14:31:02
 
EXAM: Comprehensive 2D, Doppler, and color-flow 
Echocardiogram 
Patient Location: Bedside   
Room #:  [facility]     Status:  routine
 
      BSA:         2.41
HR: 70 bpm BP:          102/61 mmHg 
Rhythm: NSR     
 
Other Information 
Study Quality: Technically Difficult
Technically limited study due to  uncooperative patient, inability to 
position patient.
 
Indications
Dyspnea 
Pacemaker
Hypertension/HDD
 
2D Dimensions
IVSd:  12.78 (7-11mm) LVOT Diam:  23.28 (18-24mm) 
LVDd:  62.35 mm  
PWd:  13.33 (7-11mm) Ascending Ao:  43.57 (22-36mm)
LVDs:  45.22 (25-40mm) 
Aortic Root:  36.29 mm 
 
Aortic Valve
AoV Peak Edin.:  0.99 m/s 
AO Peak Gr.:  3.95 mmHg  LVOT Max P.27 mmHg
    LVOT Max V:  0.75 m/s
SUSIE Vmax: 3.23 cm2  
 
Pulmonary Valve
PV Peak Edin.:  0.87 m/s PV Peak Gr.:  3.06 mmHg
 
 
[facility]
[address]
[place]  [address]
Phone:  [phone] 2 D/M-MODE ECHOCARDIOGRAM     
_______________________________________________________________________________
 
Name:            ANURAG LOPEZ                  Room #:        503-P       [facility] [place]
..#:           [id number]          Account #:     [id number]  
Admission:       21         Attend Phys:   Ottoniel Gipson,
Discharge:                  Date of Birth: [birth year]  
                         Report #:      [id number]
        [id number]
_______________________________________________________________________________
 
Tricuspid Valve
TR Peak Edin.:  2.59 m/s  
TR Peak Gr.:  26.78 mmHg 
    PA Pressure:  32.00 mmHg
 
Left Ventricle
The left ventricle is normal size. There is normal LV segmental wall 
motion. There is normal left ventricular wall thickness. The left 
ventricular systolic function is normal. Discordant septal motion, 
probably from an intraventricular conduction delay or RV pacing. LVEF 
is 50%. This study is not technically sufficient to allow evaluation 
of the LV diastolic function.
 
Right Ventricle
The right ventricle is normal size. The right ventricular systolic 
function is normal. Pacemaker lead is present in the right ventricle. 
 
Atria
Left atrium is at the upper limits of normal. Right atrium is at the 
upper limits of normal. Pacemaker lead is present in the right 
atrium.
 
Aortic Valve
The aortic valve is normal in structure. No aortic regurgitation is 
present. There is no aortic valvular stenosis.
 
Mitral Valve
The mitral valve is normal in structure. Trace mitral regurgitation. 
No evidence of mitral valve stenosis.
 
Tricuspid Valve
The tricuspid valve is normal in structure. There is mild tricuspid 
regurgitation. Estimated pulmonary artery pressure of 30 mmHg. 
 
Pulmonic Valve
The pulmonary valve is normal in structure. There is no pulmonic 
valvular regurgitation.
 
Great Vessels
The aortic root is normal in size. IVC is not well 
visualized.
 
Pericardium
There is no pericardial effusion.
 
 
 
[facility]
[address]
[place]  [address]
Phone:  [phone]                    2 D/M-MODE ECHOCARDIOGRAM     
_______________________________________________________________________________
 
Name:            ANURAG LOPEZ                  Room #:        503-P       ADM IN
..#:           [id number]          Account #:     [id number]  
Admission:       21         Attend Phys:   Ottoniel Gipson,
Discharge:                  Date of Birth: [birth year]  
                         Report #:      [id number]
        [id number]
_______________________________________________________________________________
<Conclusion>
The left ventricular systolic function is normal.  
LVEF is 50%.  Discordant septal motion, probably from an 
intraventricular conduction delay or RV pacing.
Left atrium is at the upper limits of normal.
The aortic valve is normal in structure. No aortic regurgitation or 
stenosis
The mitral valve is normal in structure. Trace mitral 
regurgitation.
There is mild tricuspid regurgitation. Estimated pulmonary artery 
pressure of 30 mmHg.
There is no pericardial effusion.
 
 
 
 
 
 
 
 
 
 
 
 
 
 
 
 
 
 
 
 
 
 
 
 
 
 
 
 
 
 
 
 
  <ELECTRONICALLY SIGNED>
   By: Sander Townsend MD, [facility]   
  21     1543
D: 21 1543                           _____________________________________
T: 21 1543                           Sander Townsend MD, FACC     /INF

## 2021-09-14 VITALS — DIASTOLIC BLOOD PRESSURE: 71 MMHG | SYSTOLIC BLOOD PRESSURE: 103 MMHG

## 2021-09-14 VITALS — DIASTOLIC BLOOD PRESSURE: 97 MMHG | SYSTOLIC BLOOD PRESSURE: 142 MMHG

## 2021-09-14 NOTE — NUR
assumed care approx 1900 evening 9/13. pt lying in bed with head of bed
elevated at change of shift. pt voiding per urinal. pt pleasant and
cooperative, at times forgetful. pt took hs meds with honey thick tea
tolerating well. pt appears to be sleeping soundly. bed alarm on and call
light in reach. will continue to monitor. no

## 2021-09-14 NOTE — NUR
ASSUMED CARE AT 0700. PATIENT IS ALERT AND ORIENTEDX1. PATIENT MOREIRA'S, 
ARE EQUAL. LUNGS ARE CLEAR AND DEMINISHED. ABD IS SOFT WITH BSX4. UP TO THE
BATHROOM WITH ASSIST OF 1 STAFF AND GAIT BELT. PLAN MRI LATER TODAY. IV/S.L.
PATENT AND INTACT. FALL AND SAFETY PROTOCOLS IN PLACE. DENIES PAIN. CONTINUES
TO PROGRESS SLOWLY TOWARDS D/C GOALS. WILL CONTINUE TO MONITER.

## 2021-09-14 NOTE — NUR
Team meeting, recommendation:  Going to mri of brain today. Mostly supervisor
touch for transfer and mobility. Ustep walker. Wife unable to provide any
physical assist. Need reminder on safety;. 250 fww with close stand by with u
step walker.  mod to severe for cognition and memory.  No driving till cleared
by MD, diet honey thick with family request to have thin liquids. BPCI.  dc
9/16 with Lily Roland ( pt, ot, st, nursing and sw) alone with private
duty.

## 2021-09-15 VITALS — SYSTOLIC BLOOD PRESSURE: 144 MMHG | DIASTOLIC BLOOD PRESSURE: 99 MMHG

## 2021-09-15 NOTE — NUR
ASSUMED CARE AT 1900 OF 9/14. PATIENT IS A&O TO PERSON AND SITUATION, CAN BE
FORGETFUL AT TIMES. TOLERATED ORAL MEDS WHOLE IN APPLE SAUCE. DENIES PAIN OR
SOB. MINIMAL ASSIST OF 1 USING GB AND WALKER TO TRANSFER AND AMBULATE TO
BATHROOM TO HAVE A LARGE BM. URINAL PLACED AT BEDSIDE. FALL PRECAUTIONS IN
PLACE, CALL LIGHT WITHIN REACH. WILL CONTINUE TO MONITOR.

## 2021-09-15 NOTE — NUR
Spoke with wife yara via phone call, she agrees with dcp. Reported [place]
private duty is who they use. Lily gold  has already called. Son
will pick him up after 1pm and then like to talk with  about medications.
Passed on information to NP to address medication question with yara. Will
cont following as needed for dc needs. BPCI.

## 2021-09-15 NOTE — NUR
ASSUMEDCARE AT 0700. PATIENT IS ALERT AND ORIENTED X1 TO PERSON, PATIENT IS
FORGETFUL. PATIENT HAS MILD SLURED SPEECH, AND LEFT FACIAL DROOP. PATIENT MRI
WAS NEGATIVE FOR STROKE. MEDS ADJUSTED BY DR. NGUYEN. PATIENT MOREIRA'S,  ARE
EQUAL. LUNGS ARE CLEAR AND DEMINISHED. ABD IS SOFT WITH BSX4. LAXATIVES GIVEN.
PATIENT CONTINUES TO USE HIS URINAL TO VOID COTY COLORED URINE. UP WITH
WALKER AND GAIT BELT TO AMBULATED TO [facility] ROOM WITH S.T. FOR BREAKFAST.
BP LOW.  BP MED HELD. FALL AND SAFETY PROTOCOLS IN PLACE. DENIES PAIN AT THIS
TIME. CONTINUES TO PROGRESS TOWARDS D/C GOALS. PLAN D/C FOR ILYA. RENEA IS
PATENT IN HIS RIGHT AC. WILL CONTINUE TO MONITER.

## 2021-09-16 VITALS — DIASTOLIC BLOOD PRESSURE: 71 MMHG | SYSTOLIC BLOOD PRESSURE: 108 MMHG

## 2021-09-16 VITALS — DIASTOLIC BLOOD PRESSURE: 99 MMHG | SYSTOLIC BLOOD PRESSURE: 144 MMHG

## 2021-09-16 VITALS — DIASTOLIC BLOOD PRESSURE: 93 MMHG | SYSTOLIC BLOOD PRESSURE: 142 MMHG

## 2021-09-16 LAB
ANION GAP SERPL CALC-SCNC: 7 MMOL/L (ref 7–16)
BUN SERPL-MCNC: 21 MG/DL (ref 7–18)
CALCIUM SERPL-MCNC: 8.4 MG/DL (ref 8.5–10.1)
CHLORIDE SERPL-SCNC: 104 MMOL/L (ref 98–107)
CO2 SERPL-SCNC: 28 MMOL/L (ref 21–32)
CREAT SERPL-MCNC: 1.3 MG/DL (ref 0.7–1.3)
ERYTHROCYTE [DISTWIDTH] IN BLOOD BY AUTOMATED COUNT: 12.6 % (ref 10.5–14.5)
GLUCOSE SERPL-MCNC: 128 MG/DL (ref 74–106)
HCT VFR BLD CALC: 39.3 % (ref 42–52)
HGB BLD-MCNC: 13.1 GM/DL (ref 14–18)
MCH RBC QN AUTO: 31.3 PG (ref 26–34)
MCHC RBC AUTO-ENTMCNC: 33.4 G/DL (ref 28–37)
MCV RBC: 93.7 FL (ref 80–100)
PLATELET # BLD: 136 THOU/UL (ref 150–400)
POTASSIUM SERPL-SCNC: 4.2 MMOL/L (ref 3.5–5.1)
RBC # BLD AUTO: 4.2 MIL/UL (ref 4.5–6)
SODIUM SERPL-SCNC: 139 MMOL/L (ref 136–145)
WBC # BLD AUTO: 5.8 THOU/UL (ref 4–11)

## 2021-09-16 NOTE — NUR
PT ALERT AND ORIENTED X 1.  PT TOOK HS MEDS IN APPLESAUCE WITHOUT DIFFICULTY.
SOTALOL HELD AT HS PER PARAMETERS.  PT DENIES PAIN OR DISCOMFORT.  BED ALARM
ON FOR SAFETY.  PT APPEARS TO BE SLEEPING ON HOURLY ROUNDS.

## 2021-09-16 NOTE — NUR
assumed care approx 1900 evening 9/15. pt appropriate and cooperative. pt
voiding per urinal. pt took hs meds with small amt thin water tolerating well.
pt appears to be sleeping soundly.bed alarm on and call light in reach.
will continue to monitor.

## 2021-09-16 NOTE — EEG
[facility]
[address]
[place]  [address]                      ELECTROENCEPHALOGRAM          
_______________________________________________________________________________
 
Name:       ANURAG LOPEZ                   Room #:         503-P       ADM IN  
M.R.#:      [id number]      Account #:      [id number]  
Admission:  08/27/21     Attend Phys:    Ottoniel Gipson MD
Discharge:               Date of Birth:  [birth year]  
                                                           Report #: [id number]
    [id number]
_______________________________________________________________________________
THIS REPORT FOR:   //name//                          
 
 
This patient is being evaluated for altered mental status.  EEG was done by 
placing the electrode by standard 10-20 system of electrode placement.  Both 
referential and sequential montages were used for recording.  Background 
activity in this patient's EEG is about 7 Hz and 20 microvolt.  Photic 
stimulation is unremarkable.  EEG continued to be slow on both sides.  
Throughout the record, no active epileptiform activity was noticed.
 
IMPRESSION:  This patient's EEG is intermixed with theta range slowing on both 
sides.  That is a nonspecific abnormality which can occur with encephalopathy, 
effect of psychotropic medication, dementia, etc.
 
 
 
 
 
 
 
 
 
 
 
 
 
 
 
 
 
 
 
 
 
 
 
 
 
 
 
 
 
 
 
       <ELECTRONICALLY SIGNED>
   By: Michael Lopez MD         
  09/16/21     1053
D: 09/13/21 1412                           _____________________________________
T: 09/13/21 1547                           Michael Lopez MD           /nt

## 2021-09-16 NOTE — EKG
[place] [address] [facility] [employer]
[place]  [address]
Phone:  [phone]                    ELECTROCARDIOGRAM REPORT      
_______________________________________________________________________________
 
Name:       ANURAG LOPEZ                  Room #:         503-P       ADM IN  
M.R.#:      [id number]     Account #:      [id number]  
Admission:  21    Attend Phys:    Ottoniel Gipson MD 
Discharge:              Date of Birth:  [birth year]  
                                                          Report #: [id number]
   [id number]
_______________________________________________________________________________
                          [facility]
                                       
Test Date:    2021               Test Time:    10:31:01
Pat Name:     ANURAG LOPEZ             Department:   
Patient ID:   [id number]            Room:         503 P
Gender:       M                        Technician:   TONI
:          [id number]               Requested By: Lara Winslow
Order Number: [id number] MD:   Demetris Kraus
                                 Measurements
Intervals                              Axis          
Rate:         70                       P:            
GA:           197                      QRS:          -83
QRSD:         171                      T:            79
QT:           462                                    
QTc:          499                                    
                           Interpretive Statements
Atrial-paced complexes
Nonspecific IVCD with LAD
Left ventricular hypertrophy
Compared to ECG 2021 11:54:09
Ventricular-paced complex(es) or rhythm no longer present
Electronically Signed On 2021 11:50:34 CDT by Demetris Kraus
[email or web address]
 
 
 
 
 
 
 
 
 
 
 
 
 
 
 
 
 
 
 
 
  <ELECTRONICALLY SIGNED>
   By: Demetris Kraus MD, [facility]    
  21     1150
D: 21 1031                           _____________________________________
T: 21 1031                           Demetris Kraus MD, [facility]      /EPI

## 2021-09-17 VITALS — SYSTOLIC BLOOD PRESSURE: 156 MMHG | DIASTOLIC BLOOD PRESSURE: 102 MMHG

## 2021-09-17 VITALS — SYSTOLIC BLOOD PRESSURE: 125 MMHG | DIASTOLIC BLOOD PRESSURE: 84 MMHG

## 2021-09-17 VITALS — DIASTOLIC BLOOD PRESSURE: 106 MMHG | SYSTOLIC BLOOD PRESSURE: 168 MMHG

## 2021-09-17 VITALS — SYSTOLIC BLOOD PRESSURE: 155 MMHG | DIASTOLIC BLOOD PRESSURE: 100 MMHG

## 2021-09-17 VITALS — DIASTOLIC BLOOD PRESSURE: 84 MMHG | SYSTOLIC BLOOD PRESSURE: 125 MMHG

## 2021-09-17 LAB
BILIRUB UR-MCNC: NEGATIVE MG/DL
COLOR UR: YELLOW
KETONES UR STRIP-MCNC: NEGATIVE MG/DL
NITRITE UR QL STRIP: NEGATIVE
RBC # UR STRIP: NEGATIVE /UL
SP GR UR STRIP: 1.02 (ref 1–1.03)
URINE CLARITY: CLEAR
URINE GLUCOSE-RANDOM*: NEGATIVE
URINE LEUKOCYTES: NEGATIVE
URINE PROTEIN (DIPSTICK): NEGATIVE
UROBILINOGEN UR STRIP-ACNC: 0.2 E.U./DL (ref 0.2–1)

## 2021-09-17 NOTE — NUR
Chart review, discussed with NP, he is requiring some IVF. No anticipated dc
over the weekend. If he does dc call Lily Roland  and fax
dc orders to .

## 2021-09-17 NOTE — NUR
ASSUMED CARE AT 1900. PATIENT BP IS ELEVATED WITH NO SIGNS OF DECREASING AFTER
REDUCTION OF RATE TO 125ML/HR. PATIENT DENIES HEADACHE, DIZZINESS OR ANY
VISSION CHANGES. REPORTS FEELING FINE. MANUAL BP READINGS PERFORMED, 156/102.
ON CALL PROVIDER RIGOBERTO NOTIFED AND ORDERED FOR CONTINUOUS FLUIDS TO BE
STOPPED AT THIS TIME. WILL CONTINUE TO MONITOR PATIENT.

## 2021-09-18 VITALS — DIASTOLIC BLOOD PRESSURE: 96 MMHG | SYSTOLIC BLOOD PRESSURE: 152 MMHG

## 2021-09-18 VITALS — DIASTOLIC BLOOD PRESSURE: 104 MMHG | SYSTOLIC BLOOD PRESSURE: 151 MMHG

## 2021-09-18 VITALS — SYSTOLIC BLOOD PRESSURE: 151 MMHG | DIASTOLIC BLOOD PRESSURE: 104 MMHG

## 2021-09-18 VITALS — DIASTOLIC BLOOD PRESSURE: 98 MMHG | SYSTOLIC BLOOD PRESSURE: 150 MMHG

## 2021-09-18 VITALS — DIASTOLIC BLOOD PRESSURE: 83 MMHG | SYSTOLIC BLOOD PRESSURE: 123 MMHG

## 2021-09-18 NOTE — NUR
ASSUMED CARE OF PT AT 0700, HAVE TAKEN CARE OF PT FROM 9/16 TO 9/18. PT A&OX4.
PT HAS HAD TROUBLE WITH ORTHOSTATIC BP. PT FLORINEF WAS RESTARTED ON 9/16. IVF
GIVEN ON 9/17. PT IS SYMPTOMATIC OF LOW BP WITH DIZZINESS, DIAPHORESIS, AND
NOT FOLLOWING COMMANDS. WILL CONTINUE TO MONITOR.

## 2021-09-19 VITALS — SYSTOLIC BLOOD PRESSURE: 174 MMHG | DIASTOLIC BLOOD PRESSURE: 103 MMHG

## 2021-09-19 VITALS — SYSTOLIC BLOOD PRESSURE: 159 MMHG | DIASTOLIC BLOOD PRESSURE: 98 MMHG

## 2021-09-19 VITALS — DIASTOLIC BLOOD PRESSURE: 89 MMHG | SYSTOLIC BLOOD PRESSURE: 157 MMHG

## 2021-09-19 VITALS — SYSTOLIC BLOOD PRESSURE: 79 MMHG | DIASTOLIC BLOOD PRESSURE: 51 MMHG

## 2021-09-19 VITALS — SYSTOLIC BLOOD PRESSURE: 105 MMHG | DIASTOLIC BLOOD PRESSURE: 68 MMHG

## 2021-09-19 NOTE — NUR
PT AWAKE IN BED. NOTICED TWITCHING TO RT SIDE OF FACE, MOUTH AND CHEEK. PT
DENIES ANY PAIN. PT TOOK MEDS WITH APPLESAUCE WITHOUT ANY COUGHING. PT LUNGS
CLEAR. PT REFUSED STOOL SOFTENER STATING HE HAD A BM ALREADY. PT UP WITH
WALKER THIS AM TO BATHROOM.

## 2021-09-19 NOTE — NUR
PT HAS HAD A GOOD DAY TODAY. PT DENIES ANY PAIN. PT TOLERATING DIET. PT WANTED
THIN WATER TODAY VIA ASKING THERAPY. SHE SAID HE WAS ON NECTOR THICK LIQUID,
PT STATED HE WILL BE HAVE.

## 2021-09-19 NOTE — NUR
THERAPY WORKING WITH PT AND CHECKED BP LYING 105/68, AFTER THERAPY 119/77, AND
SITTING UP IN BED 79/51.

## 2021-09-19 NOTE — NUR
ASSUMED CARE AT 1900 OF 9/18. PATIENT A&O TO PERSON AND SITUATION. BLOOD
PRESSURE CONTINUING TO IMPROVE. PATIENT REPORTS LIGHTHEADEDNESS WHEN GOING
FROM SIT TO STAND. MINIMAL ASSIST WITH TRANSFER USING GAITBELT, STAND BY
ASSIST WITH AMBULATION USING WALKER. PATIENT DENIES DIZZINESS WHILE WALKING.
DENIES PAIN OR SOB. URINAL PLACED AT BEDSIDE. CALL LIGHT WITHIN REACH AND FALL
PRECAUTIONS IN PLACE. SLEEPING DURING HOURLY ROUNDS, WILL CONTINUE TO MONITOR.

## 2021-09-20 VITALS — SYSTOLIC BLOOD PRESSURE: 157 MMHG | DIASTOLIC BLOOD PRESSURE: 107 MMHG

## 2021-09-20 VITALS — SYSTOLIC BLOOD PRESSURE: 139 MMHG | DIASTOLIC BLOOD PRESSURE: 90 MMHG

## 2021-09-20 VITALS — SYSTOLIC BLOOD PRESSURE: 111 MMHG | DIASTOLIC BLOOD PRESSURE: 56 MMHG

## 2021-09-20 VITALS — SYSTOLIC BLOOD PRESSURE: 83 MMHG | DIASTOLIC BLOOD PRESSURE: 56 MMHG

## 2021-09-20 VITALS — SYSTOLIC BLOOD PRESSURE: 66 MMHG | DIASTOLIC BLOOD PRESSURE: 44 MMHG

## 2021-09-20 VITALS — SYSTOLIC BLOOD PRESSURE: 147 MMHG | DIASTOLIC BLOOD PRESSURE: 95 MMHG

## 2021-09-20 VITALS — DIASTOLIC BLOOD PRESSURE: 74 MMHG | SYSTOLIC BLOOD PRESSURE: 117 MMHG

## 2021-09-20 NOTE — NUR
juan returned phone call to wife yara, passed on anticipated dc home tomorrow
9/21/21 with juan m  and [facility] private duty. After 1430  or later son
steven pick him up.

## 2021-09-20 NOTE — NUR
assumed care approx 1900 evening 9/19. pt lying in bed with head of bed
elevated resting and watching tv. pt pleasant and cooperative, somewhat
forgetful. voiding per urinal. pt stated he was looking forward to being
discharged this coming week. pt appears to be sleeping soundly. bed alarm on
and call light in reach. will continue to monitor.

## 2021-09-20 NOTE — NUR
Nutrition followup: pt was to D/C home on 9/17 however issues related to
orthostatic hypotension so D/C delayed. Continues to eat % of meals.
Continues on modified diet per ST. Stable weights on unit. Low nutrition risk.

## 2021-09-20 NOTE — NUR
PT ORTHOSTATIC BP'S AS CHARTED. PT WAS LESS SYMPTOMATIC TODAY OF LOW BP. PT
A&OX4. PT UP WITH JAGJIT, WALKER, AND SBA. WILL CONTINUE TO MONITOR

## 2021-09-21 VITALS — DIASTOLIC BLOOD PRESSURE: 99 MMHG | SYSTOLIC BLOOD PRESSURE: 144 MMHG

## 2021-09-21 VITALS — SYSTOLIC BLOOD PRESSURE: 73 MMHG | DIASTOLIC BLOOD PRESSURE: 50 MMHG

## 2021-09-21 VITALS — SYSTOLIC BLOOD PRESSURE: 114 MMHG | DIASTOLIC BLOOD PRESSURE: 47 MMHG

## 2021-09-21 VITALS — DIASTOLIC BLOOD PRESSURE: 109 MMHG | SYSTOLIC BLOOD PRESSURE: 177 MMHG

## 2021-09-21 VITALS — SYSTOLIC BLOOD PRESSURE: 144 MMHG | DIASTOLIC BLOOD PRESSURE: 99 MMHG

## 2021-09-21 LAB
ANION GAP SERPL CALC-SCNC: 9 MMOL/L (ref 7–16)
BASOPHILS NFR BLD AUTO: 0.7 % (ref 0–2)
BUN SERPL-MCNC: 15 MG/DL (ref 7–18)
CALCIUM SERPL-MCNC: 8.5 MG/DL (ref 8.5–10.1)
CHLORIDE SERPL-SCNC: 106 MMOL/L (ref 98–107)
CO2 SERPL-SCNC: 28 MMOL/L (ref 21–32)
CREAT SERPL-MCNC: 1.1 MG/DL (ref 0.7–1.3)
EOSINOPHIL NFR BLD: 1.4 % (ref 0–3)
ERYTHROCYTE [DISTWIDTH] IN BLOOD BY AUTOMATED COUNT: 12.7 % (ref 10.5–14.5)
GLUCOSE SERPL-MCNC: 91 MG/DL (ref 74–106)
GRANULOCYTES NFR BLD MANUAL: 70.1 % (ref 36–66)
HCT VFR BLD CALC: 38.2 % (ref 42–52)
HGB BLD-MCNC: 13.2 GM/DL (ref 14–18)
LYMPHOCYTES NFR BLD AUTO: 19.5 % (ref 24–44)
MAGNESIUM SERPL-MCNC: 1.7 MG/DL (ref 1.8–2.4)
MCH RBC QN AUTO: 31.9 PG (ref 26–34)
MCHC RBC AUTO-ENTMCNC: 34.4 G/DL (ref 28–37)
MCV RBC: 92.6 FL (ref 80–100)
MONOCYTES NFR BLD: 8.3 % (ref 1–8)
NEUTROPHILS # BLD: 3.4 THOU/UL (ref 1.4–8.2)
PLATELET # BLD: 108 THOU/UL (ref 150–400)
POTASSIUM SERPL-SCNC: 3.5 MMOL/L (ref 3.5–5.1)
RBC # BLD AUTO: 4.13 MIL/UL (ref 4.5–6)
SODIUM SERPL-SCNC: 143 MMOL/L (ref 136–145)
WBC # BLD AUTO: 4.8 THOU/UL (ref 4–11)

## 2021-09-21 NOTE — NUR
ASSUMED CARE APPROX 1900 EVENING 9/20. PT SLEEPING SOUNDLY AT CHANGE OF SHIFT.
PT AWOKE AT HS TO TAKE MEDS TOLERATING WELL. PT VOIDING PER URINAL AT BEDSIDE.
PT DENIED COMPLAINTS. PT APPEARS TO BE SLEEPING SOUNDLY, BED ALARM ON AND CALL
LIGHT IN REACH. WILL CONTINUE TO MONITOR.

## 2021-09-21 NOTE — NUR
D/C SUMMARY WRITTEN 9/15/21 NEEDS TO BE DELETED OR UNDONE BUT UNABLE TO EDIT
AND UNDO AT THIS TIME DUE TO TIMEFRAME

## 2021-09-21 NOTE — NUR
PT DC'D HOME WITH SON VIA PRIVATE VEHICLE. PT WHEELED SELF TO ELEVATOR.
CONTACT GUARD ASSIST TO VEHICLE. PT GIVEN DC INFORMATION SON STATES
UNDERSTANDING. HOME MED GIVEN TO PT'S SON. DISCUSSED PT NEEDING TO BE UPRIGHT
AT 90 DEGRESS FOR MEDICATION AND MEALS. SON AND PT STATE UNDERSTANDING.

## 2021-09-21 NOTE — NUR
team meeting, cont with dc home today. juan m gold  and private duty
home instead. Family to assist with medication and finance. vital stim. son
will pick him up this afternoon.

## 2021-11-02 ENCOUNTER — HOSPITAL ENCOUNTER (OUTPATIENT)
Dept: HOSPITAL 35 - SJCVC | Age: 71
End: 2021-11-02
Attending: INTERNAL MEDICINE
Payer: COMMERCIAL

## 2021-11-02 DIAGNOSIS — E03.9: ICD-10-CM

## 2021-11-02 DIAGNOSIS — I48.91: ICD-10-CM

## 2021-11-02 DIAGNOSIS — I45.10: ICD-10-CM

## 2021-11-02 DIAGNOSIS — R94.31: Primary | ICD-10-CM

## 2021-11-02 DIAGNOSIS — F32.9: ICD-10-CM

## 2021-11-02 DIAGNOSIS — I49.5: ICD-10-CM

## 2021-11-02 DIAGNOSIS — Z95.0: ICD-10-CM

## 2021-11-02 DIAGNOSIS — Z79.899: ICD-10-CM

## 2021-11-02 DIAGNOSIS — Z87.891: ICD-10-CM

## 2021-11-02 DIAGNOSIS — I95.1: ICD-10-CM

## 2021-11-02 DIAGNOSIS — Z88.8: ICD-10-CM
